# Patient Record
Sex: FEMALE | Race: WHITE | Employment: FULL TIME | URBAN - METROPOLITAN AREA
[De-identification: names, ages, dates, MRNs, and addresses within clinical notes are randomized per-mention and may not be internally consistent; named-entity substitution may affect disease eponyms.]

---

## 2024-02-17 ENCOUNTER — HOSPITAL ENCOUNTER (EMERGENCY)
Age: 34
Discharge: HOME | End: 2024-02-17
Attending: STUDENT IN AN ORGANIZED HEALTH CARE EDUCATION/TRAINING PROGRAM

## 2024-02-17 VITALS
DIASTOLIC BLOOD PRESSURE: 75 MMHG | HEART RATE: 90 BPM | HEIGHT: 67 IN | BODY MASS INDEX: 27.47 KG/M2 | RESPIRATION RATE: 18 BRPM | OXYGEN SATURATION: 99 % | WEIGHT: 175 LBS | SYSTOLIC BLOOD PRESSURE: 119 MMHG | TEMPERATURE: 99.1 F

## 2024-02-17 DIAGNOSIS — F43.0 ACUTE STRESS REACTION: ICD-10-CM

## 2024-02-17 DIAGNOSIS — F41.1 ANXIETY STATE: ICD-10-CM

## 2024-02-17 DIAGNOSIS — E86.0 DEHYDRATION: Primary | ICD-10-CM

## 2024-02-17 LAB
ALBUMIN SERPL-MCNC: 4.2 G/DL (ref 3.5–4.6)
ALP SERPL-CCNC: 79 U/L (ref 40–130)
ALT SERPL-CCNC: 8 U/L (ref 0–33)
ANION GAP SERPL CALCULATED.3IONS-SCNC: 9 MEQ/L (ref 9–15)
APAP SERPL-MCNC: <5 UG/ML (ref 10–30)
AST SERPL-CCNC: 15 U/L (ref 0–35)
BASOPHILS # BLD: 0.1 K/UL (ref 0–0.2)
BASOPHILS NFR BLD: 0.6 %
BILIRUB SERPL-MCNC: 0.3 MG/DL (ref 0.2–0.7)
BUN SERPL-MCNC: 13 MG/DL (ref 6–20)
CALCIUM SERPL-MCNC: 9.3 MG/DL (ref 8.5–9.9)
CHLORIDE SERPL-SCNC: 98 MEQ/L (ref 95–107)
CK SERPL-CCNC: 46 U/L (ref 0–170)
CO2 SERPL-SCNC: 28 MEQ/L (ref 20–31)
CREAT SERPL-MCNC: 0.9 MG/DL (ref 0.5–0.9)
EOSINOPHIL # BLD: 0 K/UL (ref 0–0.7)
EOSINOPHIL NFR BLD: 0.5 %
ERYTHROCYTE [DISTWIDTH] IN BLOOD BY AUTOMATED COUNT: 12 % (ref 11.5–14.5)
ETHANOL PERCENT: NORMAL G/DL
ETHANOLAMINE SERPL-MCNC: <10 MG/DL (ref 0–0.08)
GLOBULIN SER CALC-MCNC: 2.5 G/DL (ref 2.3–3.5)
GLUCOSE SERPL-MCNC: 93 MG/DL (ref 70–99)
HCT VFR BLD AUTO: 42.5 % (ref 37–47)
HGB BLD-MCNC: 14 G/DL (ref 12–16)
LYMPHOCYTES # BLD: 1.3 K/UL (ref 1–4.8)
LYMPHOCYTES NFR BLD: 15.1 %
MAGNESIUM SERPL-MCNC: 2.1 MG/DL (ref 1.7–2.4)
MCH RBC QN AUTO: 29.7 PG (ref 27–31.3)
MCHC RBC AUTO-ENTMCNC: 32.9 % (ref 33–37)
MCV RBC AUTO: 90 FL (ref 79.4–94.8)
MONOCYTES # BLD: 0.6 K/UL (ref 0.2–0.8)
MONOCYTES NFR BLD: 7 %
NEUTROPHILS # BLD: 6.4 K/UL (ref 1.4–6.5)
NEUTS SEG NFR BLD: 76.3 %
PLATELET # BLD AUTO: 309 K/UL (ref 130–400)
POTASSIUM SERPL-SCNC: 4.1 MEQ/L (ref 3.4–4.9)
PROT SERPL-MCNC: 6.7 G/DL (ref 6.3–8)
RBC # BLD AUTO: 4.72 M/UL (ref 4.2–5.4)
SALICYLATES SERPL-MCNC: <0.3 MG/DL (ref 15–30)
SODIUM SERPL-SCNC: 135 MEQ/L (ref 135–144)
TSH REFLEX: 1 UIU/ML (ref 0.44–3.86)
WBC # BLD AUTO: 8.4 K/UL (ref 4.8–10.8)

## 2024-02-17 PROCEDURE — 80179 DRUG ASSAY SALICYLATE: CPT

## 2024-02-17 PROCEDURE — 36415 COLL VENOUS BLD VENIPUNCTURE: CPT

## 2024-02-17 PROCEDURE — 96361 HYDRATE IV INFUSION ADD-ON: CPT

## 2024-02-17 PROCEDURE — 85025 COMPLETE CBC W/AUTO DIFF WBC: CPT

## 2024-02-17 PROCEDURE — 96360 HYDRATION IV INFUSION INIT: CPT

## 2024-02-17 PROCEDURE — 99284 EMERGENCY DEPT VISIT MOD MDM: CPT

## 2024-02-17 PROCEDURE — 82550 ASSAY OF CK (CPK): CPT

## 2024-02-17 PROCEDURE — 93005 ELECTROCARDIOGRAM TRACING: CPT | Performed by: STUDENT IN AN ORGANIZED HEALTH CARE EDUCATION/TRAINING PROGRAM

## 2024-02-17 PROCEDURE — 83735 ASSAY OF MAGNESIUM: CPT

## 2024-02-17 PROCEDURE — 2580000003 HC RX 258: Performed by: STUDENT IN AN ORGANIZED HEALTH CARE EDUCATION/TRAINING PROGRAM

## 2024-02-17 PROCEDURE — 82077 ASSAY SPEC XCP UR&BREATH IA: CPT

## 2024-02-17 PROCEDURE — 80053 COMPREHEN METABOLIC PANEL: CPT

## 2024-02-17 PROCEDURE — 84443 ASSAY THYROID STIM HORMONE: CPT

## 2024-02-17 PROCEDURE — 80143 DRUG ASSAY ACETAMINOPHEN: CPT

## 2024-02-17 PROCEDURE — 6370000000 HC RX 637 (ALT 250 FOR IP): Performed by: STUDENT IN AN ORGANIZED HEALTH CARE EDUCATION/TRAINING PROGRAM

## 2024-02-17 RX ORDER — HYDROXYZINE HYDROCHLORIDE 25 MG/1
25 TABLET, FILM COATED ORAL EVERY 8 HOURS PRN
Qty: 30 TABLET | Refills: 0 | Status: SHIPPED | OUTPATIENT
Start: 2024-02-17 | End: 2024-02-27

## 2024-02-17 RX ORDER — 0.9 % SODIUM CHLORIDE 0.9 %
1000 INTRAVENOUS SOLUTION INTRAVENOUS ONCE
Status: COMPLETED | OUTPATIENT
Start: 2024-02-17 | End: 2024-02-17

## 2024-02-17 RX ORDER — HYDROXYZINE HYDROCHLORIDE 25 MG/1
25 TABLET, FILM COATED ORAL ONCE
Status: COMPLETED | OUTPATIENT
Start: 2024-02-17 | End: 2024-02-17

## 2024-02-17 RX ADMIN — SODIUM CHLORIDE 1000 ML: 9 INJECTION, SOLUTION INTRAVENOUS at 13:41

## 2024-02-17 RX ADMIN — HYDROXYZINE HYDROCHLORIDE 25 MG: 25 TABLET, FILM COATED ORAL at 13:41

## 2024-02-17 ASSESSMENT — PAIN - FUNCTIONAL ASSESSMENT: PAIN_FUNCTIONAL_ASSESSMENT: NONE - DENIES PAIN

## 2024-02-17 NOTE — ED TRIAGE NOTES
Pt arrived to ED via EMS with c/o of nausea. Pt states she was driving she began to become dizzy and nauseous. Pt states she has long term covid. Pt denies dizziness currently and states she is just nauseous now. Pt states she has a hx of palpations. Pt is a&ox4

## 2024-02-17 NOTE — ED PROVIDER NOTES
[facility] ED  eMERGENCY dEPARTMENT eNCOUnter      Pt Name: Opal Garza  MRN: [id number]  Birthdate 1990  Date of evaluation: 2/17/2024  Provider: Ant Dinh MD  Note Started: 2/17/24 1:19 PM EST    HISTORY OF PRESENT ILLNESS      Chief Complaint   Patient presents with    Nausea       The history is provided by the Patient and EMS.  Opal Garza is a 33 y.o. female with a PMH clinically significant for Bipolar on Lamictal, Anxiety, Depression and Long-covid presenting to the ED via EMS c/o episode of lightheadedness, dizziness, nausea, palpitations, shortness of breath and feeling like she was going to pass out while she was driving her vehicle. Pulled to the side and got a  to help her. Patient states it started with shortness of breath and feeling like she was breathing fast.  Did have palpitations around that as well.  States that she has a history of long COVID and that she has had vasovagal episodes and palpitations due to this.  Also has neurological issues.  Also reports that her father recently passed away in December and that she has been depressed.  States she has not eaten for a month.  Says she is not drinking any fluids either.  Knows that she is depressed, but thinks that she is dealing with it okay. Normally lives in [place], but helping with the family right now who also stressed her out.  Thinks that the stress is making her long COVID worse.  Says she is sleeping about 6 to 8 hours per night, doing well there.  No suicidal ideations, homicidal ideations, auditory or visual hallucinations.  Denies any illicit substance abuse.  States she does regularly use THC for her long COVID.  Would like an appetite stimulant if she could.  Denies any alcohol.  Denies any associated: F/C/D, HA, Eye pain, Vision changes, CP, Abd pain, Vomiting, Dysuria, Hematuria, Difficulty urinating, or Concerns for Pregnancy.  Is feeling improved in the ED already.  States history  MD       33 y.o. female with a PMH clinically significant for Bipolar on Lamictal, Anxiety, Depression and Long-covid presenting to the ED via EMS c/o episode of lightheadedness, dizziness, nausea, palpitations, shortness of breath and feeling like she was going to pass out while she was driving her vehicle.  Upon initial evaluation, Pt anxious-appearing, but otherwise Afebrile, HDS and in NAD. PE as noted above.  Labs and EKG visualized and interpreted by myself and as noted above.  Given findings, clinical presentation most likely consistent w/ episode of lightheadedness and dizziness thought most likely consistent with panic attack versus exacerbation of anxiety in the setting of possible mild dehydration.  patient with recent loss in the family and increased stress at home.  Does not appear to be any danger to self or others at this time however.  Adamantly denying any suicidal ideations and yoni for safety in the ED.  Reportedly still taking her medications for bipolar as an outpatient.  No current psychiatric follow-up and state however.  Will therefore have  RN provide resources for outpatient follow-up on Monday.  No evidence of significant electrolyte abnormalities, anemia, arrhythmias or ischemic changes on EKG.  Low suspicion for cardiogenic syncope.  Patient feeling improved status post fluids in the ED.  Was observed for prolonged period time without recurrence of symptoms.  Also feeling improved status post Atarax.  Declined providing urine sample, stating she is not pregnant and does not have a urinary tract infection.  Will allow patient to be discharged without urine sample at this time.   Pt was administered   Medications   sodium chloride 0.9 % bolus 1,000 mL (0 mLs IntraVENous Stopped 2/17/24 1524)   hydrOXYzine HCl (ATARAX) tablet 25 mg (25 mg Oral Given 2/17/24 1341)       Plan: Discharge home in good condition with meds as noted below and instructions to follow up with PCPand

## 2024-02-17 NOTE — ED NOTES
Pt is awake, alert, and oriented, she was cooperative with talking with her.  Pt has no S/H/I, no A/V/T Hallucinations, and no other psychosis noted or expressed.  Pt came here in 2024 for her fathers  he  in 2023.  She has a place to stay here in [place], pt has no insurance, and little resources per the pt.  Dr Gonzales wanted pt to talk with  and give her information on places to F/U with in the community.  Written paper work on [facility] and [facility] in for assessments.  Pt accepted the information with an understanding.  Faxed Pt's medication from Dr Gonzales to [facility] advised she could pick the med up from [facility] on [address] at no cost to her.  Advised the med was Atarax and explained the medication to her

## 2024-02-19 LAB
EKG ATRIAL RATE: 85 BPM
EKG P AXIS: 54 DEGREES
EKG P-R INTERVAL: 124 MS
EKG Q-T INTERVAL: 364 MS
EKG QRS DURATION: 82 MS
EKG QTC CALCULATION (BAZETT): 433 MS
EKG R AXIS: 73 DEGREES
EKG T AXIS: 43 DEGREES
EKG VENTRICULAR RATE: 85 BPM

## 2024-02-19 PROCEDURE — 93010 ELECTROCARDIOGRAM REPORT: CPT | Performed by: INTERNAL MEDICINE

## 2024-03-11 ENCOUNTER — HOSPITAL ENCOUNTER (EMERGENCY)
Facility: HOSPITAL | Age: 34
Discharge: HOME | End: 2024-03-11
Attending: EMERGENCY MEDICINE

## 2024-03-11 ENCOUNTER — APPOINTMENT (OUTPATIENT)
Dept: CARDIOLOGY | Facility: HOSPITAL | Age: 34
End: 2024-03-11

## 2024-03-11 ENCOUNTER — APPOINTMENT (OUTPATIENT)
Dept: RADIOLOGY | Facility: HOSPITAL | Age: 34
End: 2024-03-11

## 2024-03-11 VITALS
TEMPERATURE: 98.8 F | DIASTOLIC BLOOD PRESSURE: 98 MMHG | HEART RATE: 78 BPM | WEIGHT: 169 LBS | OXYGEN SATURATION: 99 % | BODY MASS INDEX: 26.53 KG/M2 | HEIGHT: 67 IN | SYSTOLIC BLOOD PRESSURE: 134 MMHG | RESPIRATION RATE: 17 BRPM

## 2024-03-11 DIAGNOSIS — F41.9 ANXIETY: ICD-10-CM

## 2024-03-11 DIAGNOSIS — R42 DIZZINESS: Primary | ICD-10-CM

## 2024-03-11 DIAGNOSIS — R42 LIGHTHEADEDNESS: ICD-10-CM

## 2024-03-11 LAB
ALBUMIN SERPL BCP-MCNC: 4.1 G/DL (ref 3.4–5)
ALP SERPL-CCNC: 67 U/L (ref 33–110)
ALT SERPL W P-5'-P-CCNC: 10 U/L (ref 7–45)
AMPHETAMINES UR QL SCN: ABNORMAL
ANION GAP SERPL CALC-SCNC: 12 MMOL/L (ref 10–20)
APAP SERPL-MCNC: <10 UG/ML
APPEARANCE UR: ABNORMAL
AST SERPL W P-5'-P-CCNC: 15 U/L (ref 9–39)
BARBITURATES UR QL SCN: ABNORMAL
BASOPHILS # BLD AUTO: 0.06 X10*3/UL (ref 0–0.1)
BASOPHILS NFR BLD AUTO: 0.7 %
BENZODIAZ UR QL SCN: ABNORMAL
BILIRUB SERPL-MCNC: 0.7 MG/DL (ref 0–1.2)
BILIRUB UR STRIP.AUTO-MCNC: NEGATIVE MG/DL
BNP SERPL-MCNC: 12 PG/ML (ref 0–99)
BUN SERPL-MCNC: 10 MG/DL (ref 6–23)
BZE UR QL SCN: ABNORMAL
CALCIUM SERPL-MCNC: 8.9 MG/DL (ref 8.6–10.3)
CANNABINOIDS UR QL SCN: ABNORMAL
CARDIAC TROPONIN I PNL SERPL HS: <3 NG/L (ref 0–13)
CHLORIDE SERPL-SCNC: 105 MMOL/L (ref 98–107)
CO2 SERPL-SCNC: 25 MMOL/L (ref 21–32)
COLOR UR: YELLOW
CREAT SERPL-MCNC: 0.88 MG/DL (ref 0.5–1.05)
D DIMER PPP FEU-MCNC: <215 NG/ML FEU
EGFRCR SERPLBLD CKD-EPI 2021: 88 ML/MIN/1.73M*2
EOSINOPHIL # BLD AUTO: 0.03 X10*3/UL (ref 0–0.7)
EOSINOPHIL NFR BLD AUTO: 0.4 %
ERYTHROCYTE [DISTWIDTH] IN BLOOD BY AUTOMATED COUNT: 12.6 % (ref 11.5–14.5)
ETHANOL SERPL-MCNC: <10 MG/DL
FENTANYL+NORFENTANYL UR QL SCN: ABNORMAL
FLUAV RNA RESP QL NAA+PROBE: NOT DETECTED
FLUBV RNA RESP QL NAA+PROBE: NOT DETECTED
GLUCOSE SERPL-MCNC: 100 MG/DL (ref 74–99)
GLUCOSE UR STRIP.AUTO-MCNC: NEGATIVE MG/DL
HCG UR QL IA.RAPID: NEGATIVE
HCT VFR BLD AUTO: 42 % (ref 36–46)
HGB BLD-MCNC: 14.1 G/DL (ref 12–16)
HOLD SPECIMEN: 293
IMM GRANULOCYTES # BLD AUTO: 0.02 X10*3/UL (ref 0–0.7)
IMM GRANULOCYTES NFR BLD AUTO: 0.2 % (ref 0–0.9)
KETONES UR STRIP.AUTO-MCNC: ABNORMAL MG/DL
LACTATE SERPL-SCNC: 0.7 MMOL/L (ref 0.4–2)
LEUKOCYTE ESTERASE UR QL STRIP.AUTO: NEGATIVE
LIPASE SERPL-CCNC: 19 U/L (ref 9–82)
LYMPHOCYTES # BLD AUTO: 1.12 X10*3/UL (ref 1.2–4.8)
LYMPHOCYTES NFR BLD AUTO: 13.6 %
MAGNESIUM SERPL-MCNC: 1.94 MG/DL (ref 1.6–2.4)
MCH RBC QN AUTO: 29.3 PG (ref 26–34)
MCHC RBC AUTO-ENTMCNC: 33.6 G/DL (ref 32–36)
MCV RBC AUTO: 87 FL (ref 80–100)
METHADONE UR QL SCN: ABNORMAL
MONOCYTES # BLD AUTO: 0.61 X10*3/UL (ref 0.1–1)
MONOCYTES NFR BLD AUTO: 7.4 %
NEUTROPHILS # BLD AUTO: 6.42 X10*3/UL (ref 1.2–7.7)
NEUTROPHILS NFR BLD AUTO: 77.7 %
NITRITE UR QL STRIP.AUTO: NEGATIVE
NRBC BLD-RTO: 0 /100 WBCS (ref 0–0)
OPIATES UR QL SCN: ABNORMAL
OXYCODONE+OXYMORPHONE UR QL SCN: ABNORMAL
PCP UR QL SCN: ABNORMAL
PH UR STRIP.AUTO: 6 [PH]
PLATELET # BLD AUTO: 352 X10*3/UL (ref 150–450)
POTASSIUM SERPL-SCNC: 3.7 MMOL/L (ref 3.5–5.3)
PROT SERPL-MCNC: 6.6 G/DL (ref 6.4–8.2)
PROT UR STRIP.AUTO-MCNC: NEGATIVE MG/DL
RBC # BLD AUTO: 4.82 X10*6/UL (ref 4–5.2)
RBC # UR STRIP.AUTO: NEGATIVE /UL
RSV RNA RESP QL NAA+PROBE: NOT DETECTED
SALICYLATES SERPL-MCNC: <3 MG/DL
SARS-COV-2 RNA RESP QL NAA+PROBE: NOT DETECTED
SODIUM SERPL-SCNC: 138 MMOL/L (ref 136–145)
SP GR UR STRIP.AUTO: 1.01
TSH SERPL-ACNC: 1.51 MIU/L (ref 0.44–3.98)
UROBILINOGEN UR STRIP.AUTO-MCNC: <2 MG/DL
WBC # BLD AUTO: 8.3 X10*3/UL (ref 4.4–11.3)

## 2024-03-11 PROCEDURE — 2500000004 HC RX 250 GENERAL PHARMACY W/ HCPCS (ALT 636 FOR OP/ED): Performed by: NURSE PRACTITIONER

## 2024-03-11 PROCEDURE — 70450 CT HEAD/BRAIN W/O DYE: CPT | Performed by: STUDENT IN AN ORGANIZED HEALTH CARE EDUCATION/TRAINING PROGRAM

## 2024-03-11 PROCEDURE — 70450 CT HEAD/BRAIN W/O DYE: CPT

## 2024-03-11 PROCEDURE — 87637 SARSCOV2&INF A&B&RSV AMP PRB: CPT | Performed by: NURSE PRACTITIONER

## 2024-03-11 PROCEDURE — 84484 ASSAY OF TROPONIN QUANT: CPT | Performed by: NURSE PRACTITIONER

## 2024-03-11 PROCEDURE — 71046 X-RAY EXAM CHEST 2 VIEWS: CPT

## 2024-03-11 PROCEDURE — 81003 URINALYSIS AUTO W/O SCOPE: CPT | Performed by: NURSE PRACTITIONER

## 2024-03-11 PROCEDURE — 99285 EMERGENCY DEPT VISIT HI MDM: CPT | Mod: 25

## 2024-03-11 PROCEDURE — 80307 DRUG TEST PRSMV CHEM ANLYZR: CPT | Performed by: NURSE PRACTITIONER

## 2024-03-11 PROCEDURE — 80179 DRUG ASSAY SALICYLATE: CPT | Performed by: NURSE PRACTITIONER

## 2024-03-11 PROCEDURE — 85379 FIBRIN DEGRADATION QUANT: CPT | Performed by: NURSE PRACTITIONER

## 2024-03-11 PROCEDURE — 71046 X-RAY EXAM CHEST 2 VIEWS: CPT | Mod: FOREIGN READ | Performed by: RADIOLOGY

## 2024-03-11 PROCEDURE — 80143 DRUG ASSAY ACETAMINOPHEN: CPT | Performed by: NURSE PRACTITIONER

## 2024-03-11 PROCEDURE — 84443 ASSAY THYROID STIM HORMONE: CPT | Performed by: NURSE PRACTITIONER

## 2024-03-11 PROCEDURE — 2500000001 HC RX 250 WO HCPCS SELF ADMINISTERED DRUGS (ALT 637 FOR MEDICARE OP): Performed by: NURSE PRACTITIONER

## 2024-03-11 PROCEDURE — 84460 ALANINE AMINO (ALT) (SGPT): CPT | Performed by: NURSE PRACTITIONER

## 2024-03-11 PROCEDURE — 83880 ASSAY OF NATRIURETIC PEPTIDE: CPT | Performed by: NURSE PRACTITIONER

## 2024-03-11 PROCEDURE — 82077 ASSAY SPEC XCP UR&BREATH IA: CPT | Performed by: NURSE PRACTITIONER

## 2024-03-11 PROCEDURE — 36415 COLL VENOUS BLD VENIPUNCTURE: CPT | Performed by: NURSE PRACTITIONER

## 2024-03-11 PROCEDURE — 83690 ASSAY OF LIPASE: CPT | Performed by: NURSE PRACTITIONER

## 2024-03-11 PROCEDURE — 93005 ELECTROCARDIOGRAM TRACING: CPT

## 2024-03-11 PROCEDURE — 83605 ASSAY OF LACTIC ACID: CPT | Performed by: NURSE PRACTITIONER

## 2024-03-11 PROCEDURE — 81025 URINE PREGNANCY TEST: CPT | Performed by: NURSE PRACTITIONER

## 2024-03-11 PROCEDURE — 83735 ASSAY OF MAGNESIUM: CPT | Performed by: NURSE PRACTITIONER

## 2024-03-11 PROCEDURE — 85025 COMPLETE CBC W/AUTO DIFF WBC: CPT | Performed by: NURSE PRACTITIONER

## 2024-03-11 RX ORDER — LORAZEPAM 1 MG/1
1 TABLET ORAL ONCE
Status: COMPLETED | OUTPATIENT
Start: 2024-03-11 | End: 2024-03-11

## 2024-03-11 RX ADMIN — SODIUM CHLORIDE 1000 ML: 9 INJECTION, SOLUTION INTRAVENOUS at 11:41

## 2024-03-11 RX ADMIN — LORAZEPAM 1 MG: 1 TABLET ORAL at 11:36

## 2024-03-11 ASSESSMENT — LIFESTYLE VARIABLES
HAVE PEOPLE ANNOYED YOU BY CRITICIZING YOUR DRINKING: NO
EVER FELT BAD OR GUILTY ABOUT YOUR DRINKING: NO
HAVE YOU EVER FELT YOU SHOULD CUT DOWN ON YOUR DRINKING: NO
EVER HAD A DRINK FIRST THING IN THE MORNING TO STEADY YOUR NERVES TO GET RID OF A HANGOVER: NO

## 2024-03-11 ASSESSMENT — PAIN - FUNCTIONAL ASSESSMENT: PAIN_FUNCTIONAL_ASSESSMENT: 0-10

## 2024-03-11 ASSESSMENT — PAIN SCALES - GENERAL
PAINLEVEL_OUTOF10: 0 - NO PAIN
PAINLEVEL_OUTOF10: 0 - NO PAIN

## 2024-03-11 NOTE — ED PROVIDER NOTES
[facility]  ED  Encounter Note  Admit Date/RoomTime: 3/11/2024 11:06 AM  ED Room: [facility]  NAME: Anne Parker  : 1990  MRN: [id number]     Chief Complaint:  Dizziness (Just started new dose of Wellbutrin today (went from 75-->150mg) and also smoke a bowl of weed this morning (states this is normal for her). Some slight intermittent chest pressure but mostly dizziness. )    HISTORY OF PRESENT ILLNESS        Anne Parker is a 33 y.o. female who presents to the ED for evaluation of acute dizziness. She stated that she acutely felt unwell. She stated that she does not have a headache or hx of migraine but feels unwell. She stated that she is a little dizzy. She stated that a few things have been going on = her depression meds recently increased and she does smoke MJ daily which is not unusual. She denied any numbness weakness vision issues. No birth control, blood thinners.    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 No stents from same and did not need a pacemaker/defib. She stated that she now lives in [place]. Dad just  and is going thru a lot. No fever or chills,coughing, urinary issues.     ROS   Pertinent positives and negatives are stated within HPI, all other systems reviewed and are negative.    Past Medical History:  has a past medical history of Bipolar 1 disorder (CMS/[facility]). Anxiety depression MI cardiac arrest  long covid    Surgical History:  has no past surgical history on file.    Social History:   denied nicotine alcohol + MJ use    Family History: family history is not on file.     Allergies: Patient has no known allergies.    PHYSICAL EXAM   Oxygen Saturation Interpretation: Normal.         Physical Exam  Constitutional/General: Alert and oriented x3, well appearing, non toxic  HEENT:  NC/NT. PERRLA.  Airway patent.  Neck: Supple, full ROM. No midline vertebral  tenderness or crepitus.   Respiratory: Lung sounds clear to auscultation bilaterally. No wheezes, rhonchi or stridor. Not in respiratory distress.  CV:  Regular rate. Regular rhythm. No murmurs or rubs. 2+ distal pulses.  GI:  Abdomen soft, non-tender, non-distended. +BS. No rebound, guarding, or rigidity. No pulsatile masses.  Musculoskeletal: Moves all extremities x 4. Warm and well perfused. Capillary refill <3 seconds  Integument: Skin warm and dry. No rashes.   Neurologic: Alert and oriented with no focal deficits, symmetric strength 5/5 in the upper and lower extremities bilaterally.  Psychiatric: Normal affect.    Lab / Imaging Results   (All laboratory and radiology results have been personally reviewed by myself)  Labs:  Results for orders placed or performed during the hospital encounter of 03/11/24   hCG, Urine, Qualitative   Result Value Ref Range    HCG, Urine NEGATIVE NEGATIVE   CBC and Auto Differential   Result Value Ref Range    WBC 8.3 4.4 - 11.3 x10*3/uL    nRBC 0.0 0.0 - 0.0 /100 WBCs    RBC 4.82 4.00 - 5.20 x10*6/uL    Hemoglobin 14.1 12.0 - 16.0 g/dL    Hematocrit 42.0 36.0 - 46.0 %    MCV 87 80 - 100 fL    MCH 29.3 26.0 - 34.0 pg    MCHC 33.6 32.0 - 36.0 g/dL    RDW 12.6 11.5 - 14.5 %    Platelets 352 150 - 450 x10*3/uL    Neutrophils % 77.7 40.0 - 80.0 %    Immature Granulocytes %, Automated 0.2 0.0 - 0.9 %    Lymphocytes % 13.6 13.0 - 44.0 %    Monocytes % 7.4 2.0 - 10.0 %    Eosinophils % 0.4 0.0 - 6.0 %    Basophils % 0.7 0.0 - 2.0 %    Neutrophils Absolute 6.42 1.20 - 7.70 x10*3/uL    Immature Granulocytes Absolute, Automated 0.02 0.00 - 0.70 x10*3/uL    Lymphocytes Absolute 1.12 (L) 1.20 - 4.80 x10*3/uL    Monocytes Absolute 0.61 0.10 - 1.00 x10*3/uL    Eosinophils Absolute 0.03 0.00 - 0.70 x10*3/uL    Basophils Absolute 0.06 0.00 - 0.10 x10*3/uL   Magnesium   Result Value Ref Range    Magnesium 1.94 1.60 - 2.40 mg/dL   Comprehensive metabolic panel   Result Value Ref Range    Glucose  100 (H) 74 - 99 mg/dL    Sodium 138 136 - 145 mmol/L    Potassium 3.7 3.5 - 5.3 mmol/L    Chloride 105 98 - 107 mmol/L    Bicarbonate 25 21 - 32 mmol/L    Anion Gap 12 10 - 20 mmol/L    Urea Nitrogen 10 6 - 23 mg/dL    Creatinine 0.88 0.50 - 1.05 mg/dL    eGFR 88 >60 mL/min/1.73m*2    Calcium 8.9 8.6 - 10.3 mg/dL    Albumin 4.1 3.4 - 5.0 g/dL    Alkaline Phosphatase 67 33 - 110 U/L    Total Protein 6.6 6.4 - 8.2 g/dL    AST 15 9 - 39 U/L    Bilirubin, Total 0.7 0.0 - 1.2 mg/dL    ALT 10 7 - 45 U/L   Lipase   Result Value Ref Range    Lipase 19 9 - 82 U/L   Lactate   Result Value Ref Range    Lactate 0.7 0.4 - 2.0 mmol/L   Troponin I, High Sensitivity   Result Value Ref Range    Troponin I, High Sensitivity <3 0 - 13 ng/L   B-Type Natriuretic Peptide   Result Value Ref Range    BNP 12 0 - 99 pg/mL   TSH with reflex to Free T4 if abnormal   Result Value Ref Range    Thyroid Stimulating Hormone 1.51 0.44 - 3.98 mIU/L   D-dimer, VTE Exclusion   Result Value Ref Range    D-Dimer, Quantitative VTE Exclusion <215 <=500 ng/mL FEU   Sars-CoV-2 and Influenza A/B PCR   Result Value Ref Range    Flu A Result Not Detected Not Detected    Flu B Result Not Detected Not Detected    Coronavirus 2019, PCR Not Detected Not Detected   RSV PCR   Result Value Ref Range    RSV PCR Not Detected Not Detected   Urinalysis with Reflex Culture and Microscopic   Result Value Ref Range    Color, Urine Yellow Straw, Yellow    Appearance, Urine Hazy (N) Clear    Specific Gravity, Urine 1.009 1.005 - 1.035    pH, Urine 6.0 5.0, 5.5, 6.0, 6.5, 7.0, 7.5, 8.0    Protein, Urine NEGATIVE NEGATIVE mg/dL    Glucose, Urine NEGATIVE NEGATIVE mg/dL    Blood, Urine NEGATIVE NEGATIVE    Ketones, Urine 5 (TRACE) (A) NEGATIVE mg/dL    Bilirubin, Urine NEGATIVE NEGATIVE    Urobilinogen, Urine <2.0 <2.0 mg/dL    Nitrite, Urine NEGATIVE NEGATIVE    Leukocyte Esterase, Urine NEGATIVE NEGATIVE   Extra Urine Gray Tube   Result Value Ref Range    Extra Tube 293    Drug  Screen, Urine   Result Value Ref Range    Amphetamine Screen, Urine Presumptive Negative Presumptive Negative    Barbiturate Screen, Urine Presumptive Negative Presumptive Negative    Benzodiazepines Screen, Urine Presumptive Negative Presumptive Negative    Cannabinoid Screen, Urine Presumptive Positive (A) Presumptive Negative    Cocaine Metabolite Screen, Urine Presumptive Negative Presumptive Negative    Fentanyl Screen, Urine Presumptive Negative Presumptive Negative    Opiate Screen, Urine Presumptive Negative Presumptive Negative    Oxycodone Screen, Urine Presumptive Negative Presumptive Negative    PCP Screen, Urine Presumptive Negative Presumptive Negative    Methadone Screen, Urine Presumptive Negative Presumptive Negative   Ethanol   Result Value Ref Range    Alcohol <10 <=10 mg/dL   Acetaminophen level   Result Value Ref Range    Acetaminophen <10.0 10.0 - 30.0 ug/mL   Salicylate level   Result Value Ref Range    Salicylate  <3 4 - 20 mg/dL   ECG 12 lead   Result Value Ref Range    Ventricular Rate 97 BPM    Atrial Rate 97 BPM    KS Interval 127 ms    QRS Duration 84 ms    QT Interval 298 ms    QTC Calculation(Bazett) 379 ms    P Axis 67 degrees    R Axis 68 degrees    T Axis -65 degrees    QRS Count 16 beats    Q Onset 252 ms    T Offset 401 ms    QTC Fredericia 349 ms     Imaging:  All Radiology results interpreted by Radiologist unless otherwise noted.  CT head wo IV contrast   Final Result   No acute intracranial hemorrhage, mass effect, or CT apparent acute   infarct.        Signed by: Ruperto Darnell 3/11/2024 1:53 PM   Dictation workstation:   [id number]      XR chest 2 views   Final Result   No acute process.   Signed by Devin Taylor MD          ED Course / Medical Decision Making     Medications   sodium chloride 0.9 % bolus 1,000 mL (0 mL intravenous Stopped 3/11/24 1211)   LORazepam (Ativan) tablet 1 mg (1 mg oral Given 3/11/24 1136)     ED Course as of 03/12/24 1912   Mon Mar 11, 2024    1115 Rate 97 sinus rhythm, pr 127, qt qtc 298/379  Normal rate axis [PK]      ED Course User Index  [PK] Yenni Young, APRN-CNP         Diagnoses as of 24   Dizziness   Lightheadedness   Anxiety     Re-examination:    Patient’s condition stable.    Consult(s):   Offered epa           MDM:       Anne Parker is a 33 y.o. female who presents to the ED for evaluation of acute dizziness. She stated that she acutely felt unwell. She stated that she does not have a headache or hx of migraine but feels unwell. She stated that she is a little dizzy. She stated that a few things have been going on = her depression meds recently increased and she does smoke MJ daily which is not unusual. She denied any numbness weakness vision issues. No birth control, blood thinners.    She stated that post covid vaccination she had myocarditis and suffered a post cardiac arrest in [place], [place] . No stents from same and did not need a pacemaker/defib. She stated that she now lives in [place]. Dad just  and is going thru a lot. No fever or chills,coughing, urinary issues.     ED course stable  Imaging ct scan head wnl.   Cxr wnl  Lab work wnl.   Urine drug showed + THC  pt admits to daily use.  Glucose was low 70. She has not been able to eat well since dad . She was given ginger ale, crackers and fruit cup. She felt better. She was tearful during ED course. She stated that she wants to feel better. She sees doctor in [place] that prescribes her Wellbutrin which was doubled today. She is trying to establish local doctor in [facility]. She contacted [place] for counseling and psychiatrist. She cannot get in for a few weeks. She stated originally she wanted to see epat or talk with someone in ED. She feels little support. She then changed her mind as she did not want to wait any longer. She denied HI SI or av or vh or feeling paranoid. She is sleeping fair - around 5 hours. Appetite is  primarily an issue but not new.  VSS stable.  She can reach out to [facility] tomorrow for earlier appt if able.  Return back to the ED if needed. Try to eat small meals. Having lower glucoses since not eating well. She will try to eat.   Consider going back to daily Wellbutrin dose and reach out to provider today or tomorrow.  Ddx: dehydration dizziness anxiety     Plan of Care/Counseling:  I reviewed today's visit with the patient  in addition to providing specific details for the plan of care and counseling regarding the diagnosis and prognosis.  Questions are answered at this time and are agreeable with the plan.    ASSESSMENT     1. Dizziness    2. Lightheadedness    3. Anxiety      PLAN   Discharge home     New Medications     New Medications Ordered This Visit   Medications    sodium chloride 0.9 % bolus 1,000 mL    LORazepam (Ativan) tablet 1 mg     Electronically signed by FREDO Gramajo     **This report was transcribed using voice recognition software. Every effort was made to ensure accuracy; however, inadvertent computerized transcription errors may be present.  END OF ED PROVIDER NOTE     FREDO Gramajo  03/12/24 8792

## 2024-03-12 ENCOUNTER — HOSPITAL ENCOUNTER (EMERGENCY)
Facility: HOSPITAL | Age: 34
Discharge: LEFT BEFORE DISCHARGE | End: 2024-03-12
Attending: EMERGENCY MEDICINE

## 2024-03-12 ENCOUNTER — HOSPITAL ENCOUNTER (EMERGENCY)
Facility: HOSPITAL | Age: 34
Discharge: OTHER INSTITUTION | End: 2024-03-14
Attending: EMERGENCY MEDICINE

## 2024-03-12 VITALS
HEART RATE: 115 BPM | WEIGHT: 169 LBS | OXYGEN SATURATION: 96 % | BODY MASS INDEX: 26.53 KG/M2 | HEIGHT: 67 IN | TEMPERATURE: 99.1 F | RESPIRATION RATE: 16 BRPM | SYSTOLIC BLOOD PRESSURE: 131 MMHG | DIASTOLIC BLOOD PRESSURE: 90 MMHG

## 2024-03-12 DIAGNOSIS — F29 PSYCHOSIS, UNSPECIFIED PSYCHOSIS TYPE (MULTI): Primary | ICD-10-CM

## 2024-03-12 LAB
AMPHETAMINES UR QL SCN: ABNORMAL
BARBITURATES UR QL SCN: ABNORMAL
BENZODIAZ UR QL SCN: ABNORMAL
BZE UR QL SCN: ABNORMAL
CANNABINOIDS UR QL SCN: ABNORMAL
ETHANOL SERPL-MCNC: <10 MG/DL
FENTANYL+NORFENTANYL UR QL SCN: ABNORMAL
METHADONE UR QL SCN: ABNORMAL
OPIATES UR QL SCN: ABNORMAL
OXYCODONE+OXYMORPHONE UR QL SCN: ABNORMAL
PCP UR QL SCN: ABNORMAL

## 2024-03-12 PROCEDURE — 82077 ASSAY SPEC XCP UR&BREATH IA: CPT | Performed by: EMERGENCY MEDICINE

## 2024-03-12 PROCEDURE — 80307 DRUG TEST PRSMV CHEM ANLYZR: CPT | Performed by: EMERGENCY MEDICINE

## 2024-03-12 PROCEDURE — 4500999001 HC ED NO CHARGE

## 2024-03-12 PROCEDURE — 36415 COLL VENOUS BLD VENIPUNCTURE: CPT | Performed by: EMERGENCY MEDICINE

## 2024-03-12 PROCEDURE — 2500000001 HC RX 250 WO HCPCS SELF ADMINISTERED DRUGS (ALT 637 FOR MEDICARE OP): Performed by: EMERGENCY MEDICINE

## 2024-03-12 PROCEDURE — 99285 EMERGENCY DEPT VISIT HI MDM: CPT

## 2024-03-12 RX ORDER — LAMOTRIGINE 100 MG/1
100 TABLET ORAL 2 TIMES DAILY
Status: DISCONTINUED | OUTPATIENT
Start: 2024-03-12 | End: 2024-03-14 | Stop reason: HOSPADM

## 2024-03-12 RX ORDER — FLUOXETINE HYDROCHLORIDE 20 MG/1
20 CAPSULE ORAL DAILY
Status: DISCONTINUED | OUTPATIENT
Start: 2024-03-12 | End: 2024-03-14 | Stop reason: HOSPADM

## 2024-03-12 RX ORDER — BUPROPION HYDROCHLORIDE 150 MG/1
150 TABLET ORAL DAILY
Status: DISCONTINUED | OUTPATIENT
Start: 2024-03-12 | End: 2024-03-14 | Stop reason: HOSPADM

## 2024-03-12 RX ADMIN — FLUOXETINE 20 MG: 20 CAPSULE ORAL at 10:00

## 2024-03-12 RX ADMIN — LAMOTRIGINE 100 MG: 100 TABLET ORAL at 20:40

## 2024-03-12 RX ADMIN — LAMOTRIGINE 100 MG: 100 TABLET ORAL at 10:00

## 2024-03-12 RX ADMIN — BUPROPION HYDROCHLORIDE 150 MG: 150 TABLET, EXTENDED RELEASE ORAL at 10:00

## 2024-03-12 SDOH — HEALTH STABILITY: MENTAL HEALTH: ARE YOU HERE BECAUSE YOU TRIED TO HURT YOURSELF?: NO

## 2024-03-12 SDOH — HEALTH STABILITY: MENTAL HEALTH: DELUSIONS: CONTROLLED

## 2024-03-12 SDOH — SOCIAL STABILITY: SOCIAL NETWORK: PARENT/GUARDIAN/SIGNIFICANT OTHER INVOLVEMENT: NO INVOLVEMENT

## 2024-03-12 SDOH — HEALTH STABILITY: MENTAL HEALTH: SLEEP PATTERN: DIFFICULTY FALLING ASLEEP;RESTLESSNESS

## 2024-03-12 SDOH — HEALTH STABILITY: MENTAL HEALTH: BEHAVIORS/MOOD: CALM;PLEASANT

## 2024-03-12 SDOH — HEALTH STABILITY: MENTAL HEALTH: SLEEP PATTERN: DIFFICULTY FALLING ASLEEP

## 2024-03-12 SDOH — SOCIAL STABILITY: SOCIAL INSECURITY: FAMILY BEHAVIORS: UNABLE TO ASSESS

## 2024-03-12 SDOH — HEALTH STABILITY: MENTAL HEALTH: BEHAVIORS/MOOD: ANXIOUS;PACING;RESTLESS;PARANOID

## 2024-03-12 SDOH — HEALTH STABILITY: MENTAL HEALTH: IN THE PAST WEEK, HAVE YOU BEEN HAVING THOUGHTS ABOUT KILLING YOURSELF?: NO

## 2024-03-12 SDOH — HEALTH STABILITY: MENTAL HEALTH: SLEEP PATTERN: RESTLESSNESS

## 2024-03-12 SDOH — HEALTH STABILITY: MENTAL HEALTH: HAVE YOU WISHED YOU WERE DEAD OR WISHED YOU COULD GO TO SLEEP AND NOT WAKE UP?: NO

## 2024-03-12 SDOH — HEALTH STABILITY: MENTAL HEALTH: HALLUCINATION: AUDITORY;VISUAL

## 2024-03-12 SDOH — HEALTH STABILITY: MENTAL HEALTH: HAVE YOU ACTUALLY HAD ANY THOUGHTS OF KILLING YOURSELF?: NO

## 2024-03-12 SDOH — HEALTH STABILITY: MENTAL HEALTH: SUICIDE ASSESSMENT: ADULT (C-SSRS)

## 2024-03-12 SDOH — HEALTH STABILITY: MENTAL HEALTH: HAS SOMETHING VERY STRESSFUL HAPPENED TO YOU IN THE PAST FEW WEEKS (A SITUATION VERY HARD TO HANDLE)?: NO

## 2024-03-12 SDOH — HEALTH STABILITY: MENTAL HEALTH: NEEDS EXPRESSED: DENIES

## 2024-03-12 SDOH — SOCIAL STABILITY: SOCIAL NETWORK: VISITOR BEHAVIORS: UNABLE TO ASSESS

## 2024-03-12 SDOH — HEALTH STABILITY: MENTAL HEALTH: BEHAVIORS/MOOD: ANXIOUS;PARANOID;PACING;RESTLESS

## 2024-03-12 SDOH — HEALTH STABILITY: MENTAL HEALTH: DELUSIONS: PARANOID

## 2024-03-12 SDOH — HEALTH STABILITY: MENTAL HEALTH: BEHAVIORS/MOOD: RESTLESS

## 2024-03-12 SDOH — HEALTH STABILITY: MENTAL HEALTH: HALLUCINATION: AUDITORY

## 2024-03-12 SDOH — HEALTH STABILITY: MENTAL HEALTH: HAVE YOU EVER TRIED TO HURT YOURSELF IN THE PAST (OTHER THAN THIS TIME)?: NO

## 2024-03-12 SDOH — SOCIAL STABILITY: SOCIAL NETWORK: EMOTIONAL SUPPORT GIVEN: REASSURE

## 2024-03-12 SDOH — HEALTH STABILITY: MENTAL HEALTH: CONTENT: UNABLE TO ASSESS

## 2024-03-12 SDOH — HEALTH STABILITY: MENTAL HEALTH: CONTENT: PHOBIAS;PREOCCUPATION

## 2024-03-12 SDOH — HEALTH STABILITY: MENTAL HEALTH: BEHAVIORS/MOOD: PARANOID;RESTLESS;PACING;ANXIOUS

## 2024-03-12 SDOH — HEALTH STABILITY: MENTAL HEALTH: HAVE YOU EVER DONE ANYTHING, STARTED TO DO ANYTHING, OR PREPARED TO DO ANYTHING TO END YOUR LIFE?: NO

## 2024-03-12 SDOH — HEALTH STABILITY: MENTAL HEALTH: BEHAVIORS/MOOD: CALM

## 2024-03-12 SDOH — HEALTH STABILITY: MENTAL HEALTH: CONTENT: DELUSIONS

## 2024-03-12 SDOH — SOCIAL STABILITY: SOCIAL NETWORK: PARENT/GUARDIAN/SIGNIFICANT OTHER INVOLVEMENT: ATTENTIVE TO PATIENT NEEDS

## 2024-03-12 SDOH — HEALTH STABILITY: MENTAL HEALTH: BEHAVIORS/MOOD: IMPULSIVE;COOPERATIVE;WANDERING

## 2024-03-12 SDOH — SOCIAL STABILITY: SOCIAL NETWORK: VISITOR BEHAVIORS: CALM

## 2024-03-12 SDOH — SOCIAL STABILITY: SOCIAL NETWORK: VISITOR BEHAVIORS: SUPPORTIVE;CALM;COOPERATIVE

## 2024-03-12 SDOH — HEALTH STABILITY: MENTAL HEALTH: BEHAVIORS/MOOD: COOPERATIVE;PLEASANT

## 2024-03-12 SDOH — SOCIAL STABILITY: SOCIAL NETWORK: PARENT/GUARDIAN/SIGNIFICANT OTHER INVOLVEMENT: OTHER (COMMENT)

## 2024-03-12 SDOH — SOCIAL STABILITY: SOCIAL INSECURITY: FAMILY BEHAVIORS: SUPPORTIVE;CALM;COOPERATIVE

## 2024-03-12 SDOH — HEALTH STABILITY: MENTAL HEALTH: BEHAVIORS/MOOD: RESTLESS;PARANOID

## 2024-03-12 SDOH — HEALTH STABILITY: MENTAL HEALTH: BEHAVIORS/MOOD: ANXIOUS;RESTLESS;PARANOID;IRRITABLE;IMPULSIVE;PACING

## 2024-03-12 SDOH — HEALTH STABILITY: MENTAL HEALTH: SLEEP PATTERN: INSOMNIA

## 2024-03-12 SDOH — HEALTH STABILITY: MENTAL HEALTH: SLEEP PATTERN: RESTLESSNESS;DIFFICULTY FALLING ASLEEP

## 2024-03-12 ASSESSMENT — PAIN SCALES - GENERAL
PAINLEVEL_OUTOF10: 0 - NO PAIN
PAINLEVEL_OUTOF10: 0 - NO PAIN

## 2024-03-12 ASSESSMENT — PAIN - FUNCTIONAL ASSESSMENT
PAIN_FUNCTIONAL_ASSESSMENT: 0-10
PAIN_FUNCTIONAL_ASSESSMENT: 0-10

## 2024-03-12 ASSESSMENT — COLUMBIA-SUICIDE SEVERITY RATING SCALE - C-SSRS
6. HAVE YOU EVER DONE ANYTHING, STARTED TO DO ANYTHING, OR PREPARED TO DO ANYTHING TO END YOUR LIFE?: NO
2. HAVE YOU ACTUALLY HAD ANY THOUGHTS OF KILLING YOURSELF?: NO
2. HAVE YOU ACTUALLY HAD ANY THOUGHTS OF KILLING YOURSELF?: NO
1. IN THE PAST MONTH, HAVE YOU WISHED YOU WERE DEAD OR WISHED YOU COULD GO TO SLEEP AND NOT WAKE UP?: NO
1. IN THE PAST MONTH, HAVE YOU WISHED YOU WERE DEAD OR WISHED YOU COULD GO TO SLEEP AND NOT WAKE UP?: NO
6. HAVE YOU EVER DONE ANYTHING, STARTED TO DO ANYTHING, OR PREPARED TO DO ANYTHING TO END YOUR LIFE?: NO

## 2024-03-12 ASSESSMENT — LIFESTYLE VARIABLES
HAVE YOU EVER FELT YOU SHOULD CUT DOWN ON YOUR DRINKING: NO
EVER HAD A DRINK FIRST THING IN THE MORNING TO STEADY YOUR NERVES TO GET RID OF A HANGOVER: NO
HAVE PEOPLE ANNOYED YOU BY CRITICIZING YOUR DRINKING: NO
EVER FELT BAD OR GUILTY ABOUT YOUR DRINKING: NO
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
## 2024-03-13 PROCEDURE — 2500000004 HC RX 250 GENERAL PHARMACY W/ HCPCS (ALT 636 FOR OP/ED): Performed by: EMERGENCY MEDICINE

## 2024-03-13 PROCEDURE — 2500000001 HC RX 250 WO HCPCS SELF ADMINISTERED DRUGS (ALT 637 FOR MEDICARE OP): Performed by: EMERGENCY MEDICINE

## 2024-03-13 PROCEDURE — 96372 THER/PROPH/DIAG INJ SC/IM: CPT

## 2024-03-13 PROCEDURE — 2500000001 HC RX 250 WO HCPCS SELF ADMINISTERED DRUGS (ALT 637 FOR MEDICARE OP): Performed by: PHARMACIST

## 2024-03-13 PROCEDURE — 2500000004 HC RX 250 GENERAL PHARMACY W/ HCPCS (ALT 636 FOR OP/ED)

## 2024-03-13 RX ORDER — ZIPRASIDONE HYDROCHLORIDE 20 MG/1
20 CAPSULE ORAL ONCE
Status: COMPLETED | OUTPATIENT
Start: 2024-03-13 | End: 2024-03-13

## 2024-03-13 RX ORDER — ZIPRASIDONE MESYLATE 20 MG/ML
20 INJECTION, POWDER, LYOPHILIZED, FOR SOLUTION INTRAMUSCULAR EVERY 12 HOURS PRN
Status: DISCONTINUED | OUTPATIENT
Start: 2024-03-13 | End: 2024-03-14 | Stop reason: HOSPADM

## 2024-03-13 RX ORDER — MIDAZOLAM HYDROCHLORIDE 1 MG/ML
INJECTION, SOLUTION INTRAMUSCULAR; INTRAVENOUS
Status: COMPLETED
Start: 2024-03-13 | End: 2024-03-13

## 2024-03-13 RX ORDER — ZIPRASIDONE HYDROCHLORIDE 20 MG/1
20 CAPSULE ORAL
Status: DISCONTINUED | OUTPATIENT
Start: 2024-03-13 | End: 2024-03-13

## 2024-03-13 RX ORDER — MIDAZOLAM HYDROCHLORIDE 5 MG/ML
5 INJECTION INTRAMUSCULAR; INTRAVENOUS ONCE
Status: DISCONTINUED | OUTPATIENT
Start: 2024-03-13 | End: 2024-03-14 | Stop reason: HOSPADM

## 2024-03-13 RX ADMIN — Medication 4.5 MG: at 11:14

## 2024-03-13 RX ADMIN — MIDAZOLAM 5 MG: 1 INJECTION INTRAMUSCULAR; INTRAVENOUS at 13:20

## 2024-03-13 RX ADMIN — BUPROPION HYDROCHLORIDE 150 MG: 150 TABLET, EXTENDED RELEASE ORAL at 09:04

## 2024-03-13 RX ADMIN — FLUOXETINE 20 MG: 20 CAPSULE ORAL at 09:04

## 2024-03-13 RX ADMIN — ZIPRASIDONE MESYLATE 20 MG: 20 INJECTION, POWDER, LYOPHILIZED, FOR SOLUTION INTRAMUSCULAR at 18:35

## 2024-03-13 RX ADMIN — ZIPRASIDONE HYDROCHLORIDE 20 MG: 20 CAPSULE ORAL at 11:21

## 2024-03-13 RX ADMIN — LAMOTRIGINE 100 MG: 100 TABLET ORAL at 21:46

## 2024-03-13 RX ADMIN — LAMOTRIGINE 100 MG: 100 TABLET ORAL at 09:00

## 2024-03-13 SDOH — HEALTH STABILITY: MENTAL HEALTH: SLEEP PATTERN: RESTLESSNESS

## 2024-03-13 SDOH — HEALTH STABILITY: MENTAL HEALTH: CONTENT: UNABLE TO ASSESS

## 2024-03-13 SDOH — HEALTH STABILITY: MENTAL HEALTH: SLEEP PATTERN: DIFFICULTY FALLING ASLEEP;RESTLESSNESS;SLEEPS ALL NIGHT

## 2024-03-13 SDOH — HEALTH STABILITY: MENTAL HEALTH: HAVE YOU WISHED YOU WERE DEAD OR WISHED YOU COULD GO TO SLEEP AND NOT WAKE UP?: NO

## 2024-03-13 SDOH — HEALTH STABILITY: MENTAL HEALTH

## 2024-03-13 SDOH — HEALTH STABILITY: MENTAL HEALTH: HAVE YOU ACTUALLY HAD ANY THOUGHTS OF KILLING YOURSELF?: NO

## 2024-03-13 SDOH — HEALTH STABILITY: MENTAL HEALTH: SLEEP PATTERN: RESTLESSNESS;DIFFICULTY FALLING ASLEEP

## 2024-03-13 SDOH — HEALTH STABILITY: MENTAL HEALTH: CONTENT: UNREMARKABLE

## 2024-03-13 SDOH — HEALTH STABILITY: MENTAL HEALTH: BEHAVIORS/MOOD: SLEEPING

## 2024-03-13 SDOH — HEALTH STABILITY: MENTAL HEALTH: BEHAVIORS/MOOD: RESTLESS

## 2024-03-13 SDOH — SOCIAL STABILITY: SOCIAL INSECURITY: FAMILY BEHAVIORS: APPROPRIATE FOR SITUATION;CALM;COOPERATIVE

## 2024-03-13 SDOH — HEALTH STABILITY: MENTAL HEALTH: IN THE PAST WEEK, HAVE YOU BEEN HAVING THOUGHTS ABOUT KILLING YOURSELF?: NO

## 2024-03-13 SDOH — HEALTH STABILITY: MENTAL HEALTH: HAVE YOU EVER DONE ANYTHING, STARTED TO DO ANYTHING, OR PREPARED TO DO ANYTHING TO END YOUR LIFE?: NO

## 2024-03-13 SDOH — SOCIAL STABILITY: SOCIAL NETWORK: VISITOR BEHAVIORS: APPROPRIATE FOR SITUATION;CALM;COOPERATIVE

## 2024-03-13 SDOH — HEALTH STABILITY: MENTAL HEALTH: BEHAVIORS/MOOD: CALM;COOPERATIVE

## 2024-03-13 SDOH — SOCIAL STABILITY: SOCIAL NETWORK: PARENT/GUARDIAN/SIGNIFICANT OTHER INVOLVEMENT: ATTENTIVE TO PATIENT NEEDS

## 2024-03-13 SDOH — HEALTH STABILITY: MENTAL HEALTH: ARE YOU HERE BECAUSE YOU TRIED TO HURT YOURSELF?: NO

## 2024-03-13 SDOH — SOCIAL STABILITY: SOCIAL NETWORK: EMOTIONAL SUPPORT GIVEN: REASSURE

## 2024-03-13 SDOH — HEALTH STABILITY: MENTAL HEALTH: SLEEP PATTERN: DIFFICULTY FALLING ASLEEP;INSOMNIA

## 2024-03-13 SDOH — HEALTH STABILITY: MENTAL HEALTH: NEEDS EXPRESSED: DENIES

## 2024-03-13 SDOH — SOCIAL STABILITY: SOCIAL NETWORK: PARENT/GUARDIAN/SIGNIFICANT OTHER INVOLVEMENT: NO INVOLVEMENT

## 2024-03-13 SDOH — HEALTH STABILITY: MENTAL HEALTH: SLEEP PATTERN: DIFFICULTY FALLING ASLEEP;RESTLESSNESS;DISTURBED/INTERRUPTED SLEEP

## 2024-03-13 SDOH — HEALTH STABILITY: MENTAL HEALTH: SUICIDE ASSESSMENT: ADULT (C-SSRS)

## 2024-03-13 SDOH — HEALTH STABILITY: MENTAL HEALTH: BEHAVIORS/MOOD: RESTLESS;ANXIOUS;IMPULSIVE

## 2024-03-13 SDOH — HEALTH STABILITY: MENTAL HEALTH: BEHAVIORS/MOOD: RESTLESS;LABILE

## 2024-03-13 SDOH — SOCIAL STABILITY: SOCIAL INSECURITY: FAMILY BEHAVIORS: UNABLE TO ASSESS

## 2024-03-13 SDOH — HEALTH STABILITY: MENTAL HEALTH: SLEEP PATTERN: DIFFICULTY FALLING ASLEEP;DISTURBED/INTERRUPTED SLEEP;RESTLESSNESS

## 2024-03-13 SDOH — HEALTH STABILITY: MENTAL HEALTH: HAS SOMETHING VERY STRESSFUL HAPPENED TO YOU IN THE PAST FEW WEEKS (A SITUATION VERY HARD TO HANDLE)?: NO

## 2024-03-13 SDOH — HEALTH STABILITY: MENTAL HEALTH: HAVE YOU EVER TRIED TO HURT YOURSELF IN THE PAST (OTHER THAN THIS TIME)?: NO

## 2024-03-13 SDOH — HEALTH STABILITY: MENTAL HEALTH: BEHAVIORS/MOOD: CALM;RESTLESS

## 2024-03-13 SDOH — SOCIAL STABILITY: SOCIAL NETWORK: VISITOR BEHAVIORS: UNABLE TO ASSESS

## 2024-03-13 ASSESSMENT — COLUMBIA-SUICIDE SEVERITY RATING SCALE - C-SSRS
1. SINCE LAST CONTACT, HAVE YOU WISHED YOU WERE DEAD OR WISHED YOU COULD GO TO SLEEP AND NOT WAKE UP?: NO
2. HAVE YOU ACTUALLY HAD ANY THOUGHTS OF KILLING YOURSELF?: NO
6. HAVE YOU EVER DONE ANYTHING, STARTED TO DO ANYTHING, OR PREPARED TO DO ANYTHING TO END YOUR LIFE?: NO

## 2024-03-13 NOTE — PROGRESS NOTES
I have accept care of this patient in signout.    In summary:  I received this patient in signout in summary this is a 33-year-old patient who is pink by Pratik Pichardo found to have alexus and psychosis.  Patient is pending placement at [facility].  No issues during my shift.      Labs Reviewed   DRUG SCREEN,URINE - Abnormal       Result Value    Amphetamine Screen, Urine Presumptive Negative      Barbiturate Screen, Urine Presumptive Negative      Benzodiazepines Screen, Urine Presumptive Negative      Cannabinoid Screen, Urine Presumptive Positive (*)     Cocaine Metabolite Screen, Urine Presumptive Negative      Fentanyl Screen, Urine Presumptive Negative      Opiate Screen, Urine Presumptive Negative      Oxycodone Screen, Urine Presumptive Negative      PCP Screen, Urine Presumptive Negative      Methadone Screen, Urine Presumptive Negative      Narrative:     Drug screen results are presumptive and should not be used to assess   compliance with prescribed medication. Contact the performing [facility] laboratory   to add-on definitive confirmatory testing if clinically indicated.    Toxicology screening results are reported qualitatively. The concentration must   be greater than or equal to the cutoff to be reported as positive. The concentration   at which the screening test can detect an individual drug or metabolite varies.   The absence of expected drug(s) and/or drug metabolite(s) may indicate non-compliance,   inappropriate timing of specimen collection relative to drug administration, poor drug   absorption, diluted/adulterated urine, or limitations of testing. For medical purposes   only; not valid for forensic use.    Interpretive questions should be directed to the laboratory medical directors.   ALCOHOL - Normal    Alcohol <10       No orders to display

## 2024-03-13 NOTE — PROGRESS NOTES
Emergency Medicine Transition of Care Note.    I received Anne Parker in signout from Dr. Ranulfo CASTAÑEDA.  Please see the previous ED provider note for all HPI, PE and MDM up to the time of signout at 1600. This is in addition to the primary record.    In brief Anne Parker is an 33 y.o. female presenting for   Chief Complaint   Patient presents with    Psychiatric Evaluation     Hx of BPD recently increased Wellbutrin from 75mg to 150mg. Reports no other medications. Patient eloped earlier from ED while under no PINK slip, ran to [facility], then eloped from [facility]. PCSO found patient and brought back to ED. Patient now PINK slipped by [facility]. Patient calm and cooperative at this time. Sitter at bedside.      At the time of signout we were awaiting: Placement at [facility]    Diagnoses as of 03/13/24 0033   Psychosis, unspecified psychosis type (CMS/HCC)       Medical Decision Making  Patient was signed out pending placement at [facility].  Patient remained calm and cooperative throughout my shift and was signed out to the oncoming team still pending placement at [facility].    Final diagnoses:   [F29] Psychosis, unspecified psychosis type (CMS/HCC)           Procedure  Procedures    Dave Carbajal MD

## 2024-03-13 NOTE — PROGRESS NOTES
Patient care signed out to me by Dr. Spear.  Patient is awaiting placement.  While in the emergency room patient able to hold of a needle on a while in, superficial laceration to her wrist.  Patient attempted to elope from the emergency department.  Patient stopped by staff.  Patient was carried by 4 people, including myself, on each limb into the room and physical hold was used to place the patient and locking restraints.    [facility]/ED Course  ED Course as of 03/13/24 1751   Wed Mar 13, 2024   1300 Patient was able to be taken out of restraints and took oral medications.  At this time patient is now being her head and the door.  Will put back in restraints and medications in order. []   1745 Sister, Michelle, updated on events today. She reports repressed memories coming to the patient recently since the pt's father dies a few months ago. []      ED Course User Index  [JH] Fadi Winchester MD         Diagnoses as of 03/13/24 1751   Psychosis, unspecified psychosis type (CMS/HCC)

## 2024-03-13 NOTE — PROGRESS NOTES
Emergency Medicine Transition of Care Note.    I received Anne Parker in signout from Dr. Winchester.  Please see the previous ED provider note for all HPI, PE and MDM up to the time of signout at 1752. This is in addition to the primary record.    In brief Anne Parker is an 33 y.o. female presenting for   Chief Complaint   Patient presents with    Psychiatric Evaluation     Hx of BPD recently increased Wellbutrin from 75mg to 150mg. Reports no other medications. Patient eloped earlier from ED while under no PINK slip, ran to [facility], then eloped from [facility]. PCSO found patient and brought back to ED. Patient now PINK slipped by [facility]. Patient calm and cooperative at this time. Sitter at bedside.      At the time of signout we were awaiting: A bed at [facility].  ED Course as of 03/13/24 1752   Wed Mar 13, 2024   1300 Patient was able to be taken out of restraints and took oral medications.  At this time patient is now being her head and the door.  Will put back in restraints and medications in order. []   1745 Sister, Michelle, updated on events today. She reports repressed memories coming to the patient recently since the pt's father dies a few months ago. [JH]      ED Course User Index  [JH] Fadi Winchester MD         Diagnoses as of 03/13/24 1752   Psychosis, unspecified psychosis type (CMS/HCC)       Medical Decision Making      Final diagnoses:   [F29] Psychosis, unspecified psychosis type (CMS/HCC)     Patient is currently hemodynamically stable.  She is pending a bed for treatment for psychiatric disorder.  Patient remained stable throughout her stay in the emergency department and was signed out to the overnight physician.    Procedure  Procedures    Nadira Aguilar MD

## 2024-03-14 VITALS
BODY MASS INDEX: 26.53 KG/M2 | SYSTOLIC BLOOD PRESSURE: 115 MMHG | HEART RATE: 79 BPM | WEIGHT: 169 LBS | RESPIRATION RATE: 18 BRPM | OXYGEN SATURATION: 96 % | DIASTOLIC BLOOD PRESSURE: 74 MMHG | HEIGHT: 67 IN | TEMPERATURE: 98.2 F

## 2024-03-14 LAB
APPEARANCE UR: CLEAR
BILIRUB UR STRIP.AUTO-MCNC: NEGATIVE MG/DL
COLOR UR: YELLOW
GLUCOSE UR STRIP.AUTO-MCNC: NEGATIVE MG/DL
KETONES UR STRIP.AUTO-MCNC: ABNORMAL MG/DL
LEUKOCYTE ESTERASE UR QL STRIP.AUTO: NEGATIVE
NITRITE UR QL STRIP.AUTO: NEGATIVE
PH UR STRIP.AUTO: 6 [PH]
PROT UR STRIP.AUTO-MCNC: NEGATIVE MG/DL
RBC # UR STRIP.AUTO: NEGATIVE /UL
SP GR UR STRIP.AUTO: 1.01
UROBILINOGEN UR STRIP.AUTO-MCNC: <2 MG/DL

## 2024-03-14 PROCEDURE — 2500000001 HC RX 250 WO HCPCS SELF ADMINISTERED DRUGS (ALT 637 FOR MEDICARE OP): Performed by: EMERGENCY MEDICINE

## 2024-03-14 PROCEDURE — 81003 URINALYSIS AUTO W/O SCOPE: CPT | Performed by: STUDENT IN AN ORGANIZED HEALTH CARE EDUCATION/TRAINING PROGRAM

## 2024-03-14 PROCEDURE — 2500000001 HC RX 250 WO HCPCS SELF ADMINISTERED DRUGS (ALT 637 FOR MEDICARE OP): Performed by: PHARMACIST

## 2024-03-14 RX ORDER — LORAZEPAM 1 MG/1
1 TABLET ORAL ONCE
Status: COMPLETED | OUTPATIENT
Start: 2024-03-14 | End: 2024-03-14

## 2024-03-14 RX ADMIN — FLUOXETINE 20 MG: 20 CAPSULE ORAL at 10:46

## 2024-03-14 RX ADMIN — LORAZEPAM 1 MG: 1 TABLET ORAL at 13:28

## 2024-03-14 RX ADMIN — BUPROPION HYDROCHLORIDE 150 MG: 150 TABLET, EXTENDED RELEASE ORAL at 10:46

## 2024-03-14 RX ADMIN — LAMOTRIGINE 100 MG: 100 TABLET ORAL at 10:45

## 2024-03-14 RX ADMIN — Medication 4.5 MG: at 10:47

## 2024-03-14 SDOH — HEALTH STABILITY: MENTAL HEALTH: BEHAVIORS/MOOD: SLEEPING

## 2024-03-14 SDOH — HEALTH STABILITY: MENTAL HEALTH: BEHAVIORS/MOOD: CALM;COOPERATIVE

## 2024-03-14 SDOH — HEALTH STABILITY: MENTAL HEALTH: CONTENT: UNABLE TO ASSESS

## 2024-03-14 SDOH — HEALTH STABILITY: MENTAL HEALTH: BEHAVIORS/MOOD: CALM;COOPERATIVE;APPROPRIATE FOR AGE;APPROPRIATE FOR SITUATION

## 2024-03-14 SDOH — HEALTH STABILITY: MENTAL HEALTH: CONTENT: UNREMARKABLE

## 2024-03-14 SDOH — HEALTH STABILITY: MENTAL HEALTH

## 2024-03-14 NOTE — PROGRESS NOTES
Emergency Medicine Transition of Care Note.    I received Anne Parker in signout from Dr. Spear.  Please see the previous ED provider note for all HPI, PE and MDM up to the time of signout. This is in addition to the primary record.    In brief Anne Parker is an 33 y.o. female presenting for   Chief Complaint   Patient presents with    Psychiatric Evaluation     Hx of BPD recently increased Wellbutrin from 75mg to 150mg. Reports no other medications. Patient eloped earlier from ED while under no PINK slip, ran to [facility], then eloped from [facility]. PCSO found patient and brought back to ED. Patient now PINK slipped by [facility]. Patient calm and cooperative at this time. Sitter at bedside.         ED Course as of 03/14/24 1159   Wed Mar 13, 2024   1300 Patient was able to be taken out of restraints and took oral medications.  At this time patient is now being her head and the door.  Will put back in restraints and medications in order. []   4625 Sister, Michelle, updated on events today. She reports repressed memories coming to the patient recently since the pt's father dies a few months ago. [JH]      ED Course User Index  [JH] Fadi Winchester MD         Diagnoses as of 03/14/24 1159   Psychosis, unspecified psychosis type (CMS/HCC)       Medical Decision Making  Patient was signed out to me pending psychiatric placement.  They were able to secure indigent funding for this patient so she has been accepted at [facility].        Final diagnoses:   [F29] Psychosis, unspecified psychosis type (CMS/HCC)           Procedure  Procedures    Collin Suárez MD

## 2024-03-14 NOTE — PROGRESS NOTES
Spiritual Care Visit    Clinical Encounter Type  Visited With: Patient  Routine Visit: Introduction    [facility] Encounters  [facility] Needs: Prayer

## 2024-03-14 NOTE — PROGRESS NOTES
I have accept care of this patient in signout.    In summary:  I received this patient in signout.  Patient is pending bed availability.  While here she did have urinary retention which she states she has had intermittently before.  Bedside ultrasound showed greater than 1 L and she was straight cathed.  Otherwise no other issues or concerns.  Urine was sent down for concerns of an infection and is negative.      Labs Reviewed   DRUG SCREEN,URINE - Abnormal       Result Value    Amphetamine Screen, Urine Presumptive Negative      Barbiturate Screen, Urine Presumptive Negative      Benzodiazepines Screen, Urine Presumptive Negative      Cannabinoid Screen, Urine Presumptive Positive (*)     Cocaine Metabolite Screen, Urine Presumptive Negative      Fentanyl Screen, Urine Presumptive Negative      Opiate Screen, Urine Presumptive Negative      Oxycodone Screen, Urine Presumptive Negative      PCP Screen, Urine Presumptive Negative      Methadone Screen, Urine Presumptive Negative      Narrative:     Drug screen results are presumptive and should not be used to assess   compliance with prescribed medication. Contact the performing [facility] laboratory   to add-on definitive confirmatory testing if clinically indicated.    Toxicology screening results are reported qualitatively. The concentration must   be greater than or equal to the cutoff to be reported as positive. The concentration   at which the screening test can detect an individual drug or metabolite varies.   The absence of expected drug(s) and/or drug metabolite(s) may indicate non-compliance,   inappropriate timing of specimen collection relative to drug administration, poor drug   absorption, diluted/adulterated urine, or limitations of testing. For medical purposes   only; not valid for forensic use.    Interpretive questions should be directed to the laboratory medical directors.   URINALYSIS WITH REFLEX MICROSCOPIC - Abnormal    Color, Urine Yellow       Appearance, Urine Clear      Specific Gravity, Urine 1.008      pH, Urine 6.0      Protein, Urine NEGATIVE      Glucose, Urine NEGATIVE      Blood, Urine NEGATIVE      Ketones, Urine 20 (1+) (*)     Bilirubin, Urine NEGATIVE      Urobilinogen, Urine <2.0      Nitrite, Urine NEGATIVE      Leukocyte Esterase, Urine NEGATIVE     ALCOHOL - Normal    Alcohol <10       No orders to display

## 2024-03-15 NOTE — ED PROVIDER NOTES
HPI   Chief Complaint   Patient presents with    Medication Reaction    Manic Behavior       Patient was not evaluated on this visit.  She eloped.  She then came back in and is a separate chart.  Please see that patient encounter.                          Poly Coma Scale Score: 15                     Patient History   Past Medical History:   Diagnosis Date    Bipolar 1 disorder (CMS/[facility])      No past surgical history on file.  No family history on file.  Social History     Tobacco Use    Smoking status: Not on file    Smokeless tobacco: Not on file   Substance Use Topics    Alcohol use: Not on file    Drug use: Not on file       Physical Exam   ED Triage Vitals [03/12/24 0244]   Temperature Heart Rate Respirations BP   37.3 °C (99.1 °F) (!) 115 16 131/90      Pulse Ox Temp Source Heart Rate Source Patient Position   96 % Temporal Monitor Sitting      BP Location FiO2 (%)     Left arm --       Physical Exam    ED Course & MDM   Diagnoses as of 03/14/24 2209   Psychosis, unspecified psychosis type (CMS/HCC)       Medical Decision Making      Procedure  Procedures     Inderjit Dalton MD  03/14/24 0723

## 2024-03-16 LAB
ATRIAL RATE: 97 BPM
P AXIS: 67 DEGREES
PR INTERVAL: 127 MS
Q ONSET: 252 MS
QRS COUNT: 16 BEATS
QRS DURATION: 84 MS
QT INTERVAL: 298 MS
QTC CALCULATION(BAZETT): 379 MS
QTC FREDERICIA: 349 MS
R AXIS: 68 DEGREES
T AXIS: -65 DEGREES
T OFFSET: 401 MS
VENTRICULAR RATE: 97 BPM

## 2024-04-07 ENCOUNTER — HOSPITAL ENCOUNTER (EMERGENCY)
Facility: HOSPITAL | Age: 34
Discharge: LEFT BEFORE DISCHARGE | End: 2024-04-07

## 2024-04-07 VITALS
DIASTOLIC BLOOD PRESSURE: 81 MMHG | BODY MASS INDEX: 25.58 KG/M2 | HEART RATE: 100 BPM | HEIGHT: 67 IN | RESPIRATION RATE: 18 BRPM | TEMPERATURE: 98.1 F | WEIGHT: 163 LBS | SYSTOLIC BLOOD PRESSURE: 121 MMHG | OXYGEN SATURATION: 95 %

## 2024-04-07 PROCEDURE — 4500999001 HC ED NO CHARGE

## 2024-04-07 ASSESSMENT — COLUMBIA-SUICIDE SEVERITY RATING SCALE - C-SSRS
1. IN THE PAST MONTH, HAVE YOU WISHED YOU WERE DEAD OR WISHED YOU COULD GO TO SLEEP AND NOT WAKE UP?: NO
6. HAVE YOU EVER DONE ANYTHING, STARTED TO DO ANYTHING, OR PREPARED TO DO ANYTHING TO END YOUR LIFE?: NO
2. HAVE YOU ACTUALLY HAD ANY THOUGHTS OF KILLING YOURSELF?: NO

## 2024-04-07 ASSESSMENT — PAIN SCALES - GENERAL: PAINLEVEL_OUTOF10: 5 - MODERATE PAIN

## 2024-04-07 ASSESSMENT — PAIN - FUNCTIONAL ASSESSMENT: PAIN_FUNCTIONAL_ASSESSMENT: 0-10

## 2024-04-07 ASSESSMENT — PAIN DESCRIPTION - LOCATION: LOCATION: BACK

## 2024-04-12 ENCOUNTER — HOSPITAL ENCOUNTER (EMERGENCY)
Facility: HOSPITAL | Age: 34
Discharge: HOME | End: 2024-04-12
Attending: EMERGENCY MEDICINE

## 2024-04-12 VITALS
OXYGEN SATURATION: 96 % | SYSTOLIC BLOOD PRESSURE: 122 MMHG | WEIGHT: 159 LBS | RESPIRATION RATE: 16 BRPM | BODY MASS INDEX: 24.96 KG/M2 | TEMPERATURE: 98.2 F | HEART RATE: 82 BPM | DIASTOLIC BLOOD PRESSURE: 82 MMHG | HEIGHT: 67 IN

## 2024-04-12 DIAGNOSIS — R33.9 URINARY RETENTION: Primary | ICD-10-CM

## 2024-04-12 LAB
ANION GAP SERPL CALC-SCNC: 10 MMOL/L (ref 10–20)
APPEARANCE UR: ABNORMAL
BILIRUB UR STRIP.AUTO-MCNC: NEGATIVE MG/DL
BUN SERPL-MCNC: 8 MG/DL (ref 6–23)
CALCIUM SERPL-MCNC: 9.3 MG/DL (ref 8.6–10.3)
CHLORIDE SERPL-SCNC: 104 MMOL/L (ref 98–107)
CO2 SERPL-SCNC: 30 MMOL/L (ref 21–32)
COLOR UR: YELLOW
CREAT SERPL-MCNC: 0.87 MG/DL (ref 0.5–1.05)
EGFRCR SERPLBLD CKD-EPI 2021: 89 ML/MIN/1.73M*2
GLUCOSE SERPL-MCNC: 97 MG/DL (ref 74–99)
GLUCOSE UR STRIP.AUTO-MCNC: NEGATIVE MG/DL
HCG UR QL IA.RAPID: NEGATIVE
HOLD SPECIMEN: NORMAL
KETONES UR STRIP.AUTO-MCNC: ABNORMAL MG/DL
LEUKOCYTE ESTERASE UR QL STRIP.AUTO: NEGATIVE
NITRITE UR QL STRIP.AUTO: NEGATIVE
PH UR STRIP.AUTO: 7 [PH]
POTASSIUM SERPL-SCNC: 3.9 MMOL/L (ref 3.5–5.3)
PROT UR STRIP.AUTO-MCNC: NEGATIVE MG/DL
RBC # UR STRIP.AUTO: NEGATIVE /UL
SODIUM SERPL-SCNC: 140 MMOL/L (ref 136–145)
SP GR UR STRIP.AUTO: 1.01
UROBILINOGEN UR STRIP.AUTO-MCNC: <2 MG/DL

## 2024-04-12 PROCEDURE — 81025 URINE PREGNANCY TEST: CPT | Performed by: EMERGENCY MEDICINE

## 2024-04-12 PROCEDURE — 51702 INSERT TEMP BLADDER CATH: CPT

## 2024-04-12 PROCEDURE — 36415 COLL VENOUS BLD VENIPUNCTURE: CPT | Performed by: EMERGENCY MEDICINE

## 2024-04-12 PROCEDURE — 99283 EMERGENCY DEPT VISIT LOW MDM: CPT | Mod: 25

## 2024-04-12 PROCEDURE — 51798 US URINE CAPACITY MEASURE: CPT

## 2024-04-12 PROCEDURE — 81003 URINALYSIS AUTO W/O SCOPE: CPT | Performed by: EMERGENCY MEDICINE

## 2024-04-12 PROCEDURE — 80048 BASIC METABOLIC PNL TOTAL CA: CPT | Performed by: EMERGENCY MEDICINE

## 2024-04-12 RX ORDER — HALOPERIDOL 1 MG/1
1 TABLET ORAL ONCE
Status: DISCONTINUED | OUTPATIENT
Start: 2024-04-12 | End: 2024-04-12 | Stop reason: HOSPADM

## 2024-04-12 ASSESSMENT — COLUMBIA-SUICIDE SEVERITY RATING SCALE - C-SSRS
6. HAVE YOU EVER DONE ANYTHING, STARTED TO DO ANYTHING, OR PREPARED TO DO ANYTHING TO END YOUR LIFE?: NO
1. IN THE PAST MONTH, HAVE YOU WISHED YOU WERE DEAD OR WISHED YOU COULD GO TO SLEEP AND NOT WAKE UP?: NO
2. HAVE YOU ACTUALLY HAD ANY THOUGHTS OF KILLING YOURSELF?: NO

## 2024-04-12 ASSESSMENT — PAIN - FUNCTIONAL ASSESSMENT: PAIN_FUNCTIONAL_ASSESSMENT: 0-10

## 2024-04-12 ASSESSMENT — PAIN SCALES - GENERAL: PAINLEVEL_OUTOF10: 7

## 2024-04-12 NOTE — ED PROVIDER NOTES
Emergency Department Provider Note        MEDICAL DECISION MAKING:  Medical Decision Making  Amount and/or Complexity of Data Reviewed  External Data Reviewed: labs and notes.  Labs: ordered. Decision-making details documented in ED Course.    Risk  OTC drugs.         4/12/24     Chief Complaint   Patient presents with    Urinary Retention     X 2 days      History/Exam limitations: none.   Additional history was obtained from patient.    HPI: Anne Parker  is a 33 y.o. presents with urinary retention for 2 days.  Has not been able to urinate.  Has had this happen before.  States it is due to dysautonomia.  Denies any injury.  Denies any pelvic pain.  No vaginal bleeding or discharge.  Denies vomiting, nausea, diarrhea or back pain.  No fevers.  Symptoms are constant without modifying factors.    Past medical records, Past medical history and surgical history reviewed and as documented.  History reviewed and as noted. past medical records reviewed.    Active Ambulatory Problems     Diagnosis Date Noted    No Active Ambulatory Problems     Resolved Ambulatory Problems     Diagnosis Date Noted    No Resolved Ambulatory Problems     Past Medical History:   Diagnosis Date    Bipolar 1 disorder (Multi)         No past surgical history on file.     No family history on file.             Unless otherwise stated in this report the patient's positive and negative responses for review of systems for constitutional, eyes, ENT, cardiovascular, respiratory, gastrointestinal, neurological, genitourinary, musculoskeletal, and integument systems and related systems to the presenting problem are either as stated in the HPI or were not pertinent or were negative for the symptoms and/or complaints related to the presenting medical problem.    PHYSICAL EXAM  Triage/nursing notes and vital signs reviewed as available and as noted above.     Vitals:    04/12/24 0537   BP: 122/82   BP Location: Left arm   Patient Position: Sitting  "  Pulse: 91   Resp: 16   Temp: 36.8 °C (98.2 °F)   TempSrc: Temporal   SpO2: 96%   Weight: 72.1 kg (159 lb)   Height: 1.702 m (5' 7\")         Vital Signs reviewed and noted to be within normal limits. the patient is not hypoxic.  -General: Alert, no acute distress, patient resting comfortably  -Skin: warm, intact, no pallor noted  -Head: Normocephalic, atraumatic  -Eye: Normal conjunctiva  -Cardiac: Normal peripheral perfusion  -Respiratory: No acute distress  -Musculoskeletal: No deformity, full ROM.  -Neurological: alert and oriented, normal sensory and motor observed.  -Psychiatric: Cooperative    ED COURSE  Current subjective and objective findings, differential diagnosis includes but not limited to urinary retention, ALYSSA      Work-up performed to evaluate for differential diagnosis as clinically indicated   Orders Placed This Encounter   Procedures    Bladder Catheterization    Urinalysis with Reflex Culture and Microscopic    hCG, Urine, Qualitative    Basic metabolic panel    Urinalysis with Reflex Culture and Microscopic    Extra Urine Gray Tube    Vital Signs    Bladder scan    Maintain Urethral Catheter with Nurse Driven Indwelling Urethral Catheter Removal Protocol    Insert urethral catheter          Labs and imaging reviewed by me  and note           Labs Reviewed   URINALYSIS WITH REFLEX CULTURE AND MICROSCOPIC - Abnormal       Result Value    Color, Urine Yellow      Appearance, Urine Hazy (*)     Specific Gravity, Urine 1.015      pH, Urine 7.0      Protein, Urine NEGATIVE      Glucose, Urine NEGATIVE      Blood, Urine NEGATIVE      Ketones, Urine 20 (1+) (*)     Bilirubin, Urine NEGATIVE      Urobilinogen, Urine <2.0      Nitrite, Urine NEGATIVE      Leukocyte Esterase, Urine NEGATIVE     HCG, URINE, QUALITATIVE - Normal    HCG, Urine NEGATIVE     URINALYSIS WITH REFLEX CULTURE AND MICROSCOPIC    Narrative:     The following orders were created for panel order Urinalysis with Reflex Culture and " Microscopic.  Procedure                               Abnormality         Status                     ---------                               -----------         ------                     Urinalysis with Reflex C...[[id number]]  Abnormal            Final result               Extra Urine Gray Tube[[id number]]                            In process                   Please view results for these tests on the individual orders.   BASIC METABOLIC PANEL   EXTRA URINE GRAY TUBE        No orders to display            Pt course which Intervention and treatment included :    Procedure  Procedures    Medications   haloperidol (Haldol) tablet 1 mg (has no administration in time range)        ED Course as of 04/12/24 0657   Fri Apr 12, 2024   0558 Patient retaining urine, over 800.  Requesting Carvajal catheter.  Will place Carvajal.  Check urinalysis and BMP for renal function.  Discharged with urology follow-up [ML]   0642 Patient noted to be feeling anxious pending in the ER.  She has a history of psychosis and bipolar.  Will give the patient a dose of Haldol x 1. [ML]      ED Course User Index  [ML] Marty R Lejeune,          Diagnoses as of 04/12/24 0657   Urinary retention          DISPOSITION: Discharge  Patient is stable for discharge.  Based upon my history, physical exam, evaluation and judgement regarding the aforementioned differentials, at the time of this assessment, I do not see evidence to support the presence of any life, neurologic, or limb threatening pathology in my considered differentials. Alternate non life threatening pathology has been considered, and has been ruled out based upon the findings of my evaluation. While there may be remaining pathology considered within the differential, this is not life threatening, not limb threatening, and does not warrant further emergent evaluation or treatment at this time.  Shared decision making made with the patient as applicable.  It is my judgement that further  treatment and evaluation can safely proceed in the outpatient setting. Shared decision making was utilized to arrive at all clinical decisions. At this time I do not see evidence of an emergency medical condition based upon my medical screening examination.  All imaging and laboratory results were discussed with the patient in detail including acute as well as incidental findings.  I discussed the differential, results and discharge plan with the patient and/or family/friend/caregiver if present. Education and reassurance provided regards to presumed diagnosis. I emphasized the importance of follow-up with the physician I referred them to in the timeframe recommended. I explained reasons for the patient to return to the Emergency Department. Additional verbal discharge instructions were also given and discussed with the patient to supplement those generated by the EMR. We also discussed medications that were prescribed (if any) including common side effects and interactions as well as proper dosing and administration. The patient was advised to abstain from driving, operating heavy machinery or making significant decisions while taking medications such as opiates and muscle relaxers that may impair this. All questions were addressed. They understand return precautions and discharge instructions. The patient and/or family/friend/caregiver expressed understanding    1. Urinary retention           -------------------------------------------------------------------    04/12/24 at 5:56 AM - Marty R LeJeune, DO   Internal & Emergency Medicine          Marty R Lejeune, DO  Resident  04/12/24 0658

## 2024-04-18 ENCOUNTER — HOSPITAL ENCOUNTER (EMERGENCY)
Facility: HOSPITAL | Age: 34
Discharge: HOME | End: 2024-04-18

## 2024-04-18 VITALS
WEIGHT: 155 LBS | DIASTOLIC BLOOD PRESSURE: 71 MMHG | BODY MASS INDEX: 24.33 KG/M2 | TEMPERATURE: 98.6 F | HEART RATE: 74 BPM | HEIGHT: 67 IN | RESPIRATION RATE: 18 BRPM | SYSTOLIC BLOOD PRESSURE: 112 MMHG | OXYGEN SATURATION: 97 %

## 2024-04-18 DIAGNOSIS — T83.9XXA URINARY CATHETER COMPLICATION, INITIAL ENCOUNTER (CMS-HCC): Primary | ICD-10-CM

## 2024-04-18 LAB
AMORPH CRY #/AREA UR COMP ASSIST: ABNORMAL /HPF
APPEARANCE UR: ABNORMAL
BILIRUB UR STRIP.AUTO-MCNC: NEGATIVE MG/DL
COLOR UR: YELLOW
GLUCOSE UR STRIP.AUTO-MCNC: NEGATIVE MG/DL
HCG UR QL IA.RAPID: NEGATIVE
HOLD SPECIMEN: NORMAL
KETONES UR STRIP.AUTO-MCNC: NEGATIVE MG/DL
LEUKOCYTE ESTERASE UR QL STRIP.AUTO: NEGATIVE
MUCOUS THREADS #/AREA URNS AUTO: ABNORMAL /LPF
NITRITE UR QL STRIP.AUTO: NEGATIVE
PH UR STRIP.AUTO: 7 [PH]
PROT UR STRIP.AUTO-MCNC: NEGATIVE MG/DL
RBC # UR STRIP.AUTO: ABNORMAL /UL
RBC #/AREA URNS AUTO: >20 /HPF
SP GR UR STRIP.AUTO: 1.01
UROBILINOGEN UR STRIP.AUTO-MCNC: <2 MG/DL
WBC #/AREA URNS AUTO: ABNORMAL /HPF

## 2024-04-18 PROCEDURE — 99283 EMERGENCY DEPT VISIT LOW MDM: CPT

## 2024-04-18 PROCEDURE — 81003 URINALYSIS AUTO W/O SCOPE: CPT | Performed by: EMERGENCY MEDICINE

## 2024-04-18 PROCEDURE — 81025 URINE PREGNANCY TEST: CPT | Performed by: EMERGENCY MEDICINE

## 2024-04-18 ASSESSMENT — LIFESTYLE VARIABLES
TOTAL SCORE: 0
HAVE PEOPLE ANNOYED YOU BY CRITICIZING YOUR DRINKING: NO
EVER FELT BAD OR GUILTY ABOUT YOUR DRINKING: NO
EVER HAD A DRINK FIRST THING IN THE MORNING TO STEADY YOUR NERVES TO GET RID OF A HANGOVER: NO
HAVE YOU EVER FELT YOU SHOULD CUT DOWN ON YOUR DRINKING: NO

## 2024-04-18 ASSESSMENT — PAIN - FUNCTIONAL ASSESSMENT: PAIN_FUNCTIONAL_ASSESSMENT: 0-10

## 2024-04-18 ASSESSMENT — PAIN DESCRIPTION - PAIN TYPE: TYPE: ACUTE PAIN

## 2024-04-18 ASSESSMENT — PAIN SCALES - GENERAL: PAINLEVEL_OUTOF10: 5 - MODERATE PAIN

## 2024-04-18 NOTE — ED PROVIDER NOTES
Chief Complaint   Patient presents with    urinary sx's       HPI       33 year old female presents to the Emergency Department today complaining of recently having a brown catheter placed secondary to urinary retention from an autonomic condition. Noticed some cloudiness in the urine bag and pain at the catheter site. Denies any associated fever, chills, headache, neck pain, chest pain, shortness of breath, abdominal pain, nausea, vomiting, diarrhea, constipation, or other urinary symptoms. Requests that the catheter be removed.       History provided by:  Patient             Patient History   Past Medical History:   Diagnosis Date    Bipolar 1 disorder (Multi)      No past surgical history on file.  No family history on file.  Social History     Tobacco Use    Smoking status: Not on file    Smokeless tobacco: Not on file   Substance Use Topics    Alcohol use: Not on file    Drug use: Not on file           Physical Exam  Constitutional:       General: She is awake.      Appearance: Normal appearance.   Cardiovascular:      Rate and Rhythm: Normal rate and regular rhythm.      Pulses:           Radial pulses are 3+ on the right side and 3+ on the left side.      Heart sounds: Normal heart sounds. No murmur heard.     No friction rub. No gallop.   Pulmonary:      Effort: Pulmonary effort is normal.      Breath sounds: Normal breath sounds and air entry.   Abdominal:      General: Abdomen is flat.      Palpations: Abdomen is soft.      Tenderness: There is no abdominal tenderness. There is no right CVA tenderness or left CVA tenderness.      Comments: No suprapubic tenderness noted.    Neurological:      Mental Status: She is alert.   Psychiatric:         Behavior: Behavior is cooperative.         Labs Reviewed   URINALYSIS WITH REFLEX CULTURE AND MICROSCOPIC - Abnormal       Result Value    Color, Urine Yellow      Appearance, Urine Hazy (*)     Specific Gravity, Urine 1.015      pH, Urine 7.0      Protein, Urine  "NEGATIVE      Glucose, Urine NEGATIVE      Blood, Urine SMALL (1+) (*)     Ketones, Urine NEGATIVE      Bilirubin, Urine NEGATIVE      Urobilinogen, Urine <2.0      Nitrite, Urine NEGATIVE      Leukocyte Esterase, Urine NEGATIVE     URINALYSIS MICROSCOPIC WITH REFLEX CULTURE - Abnormal    WBC, Urine NONE      RBC, Urine >20 (*)     Mucus, Urine 1+      Amorphous Crystals, Urine 4+ (*)    HCG, URINE, QUALITATIVE - Normal    HCG, Urine NEGATIVE     URINALYSIS WITH REFLEX CULTURE AND MICROSCOPIC    Narrative:     The following orders were created for panel order Urinalysis with Reflex Culture and Microscopic.  Procedure                               Abnormality         Status                     ---------                               -----------         ------                     Urinalysis with Reflex C...[[id number]]  Abnormal            Final result               Extra Urine Gray Tube[[id number]]                            In process                   Please view results for these tests on the individual orders.   EXTRA URINE GRAY TUBE       No orders to display            ED Course & MDM   Diagnoses as of 04/18/24 0724   Urinary catheter complication, initial encounter (CMS-[facility])           Medical Decision Making  Patient was seen and evaluated by myself. Urine pregnancy was negative. Urinalysis shows no signs of infection. She as educated on the likelihood that her urinary retention will recur if the catheter is removed. Reports to understanding such. States \"I am willing to take that risk.\" Reports that she is going to self-cath. Notes that she is making an appointment with a urologist today. To return if worse in any way. Discharged in stable condition with computer instructions.     Diagnostic Impression:    1. Carvajal catheter issue           Your medication list      You have not been prescribed any medications.           Procedure  Procedures     Preet Kee, APRN-CNP  04/18/24 0724       Preet SPRINGER " JORGE Kee-CNP  04/18/24 0725

## 2024-04-18 NOTE — ED TRIAGE NOTES
Pt here with c/o cloudy urine in her brown catheter. Pt states it was placed today. Pt states she had been straight cathing prior. Not on any abx currently.

## 2024-10-07 ENCOUNTER — APPOINTMENT (OUTPATIENT)
Dept: CARDIOLOGY | Facility: HOSPITAL | Age: 34
End: 2024-10-07

## 2024-10-07 ENCOUNTER — HOSPITAL ENCOUNTER (EMERGENCY)
Facility: HOSPITAL | Age: 34
Discharge: HOME | End: 2024-10-07
Attending: STUDENT IN AN ORGANIZED HEALTH CARE EDUCATION/TRAINING PROGRAM

## 2024-10-07 ENCOUNTER — APPOINTMENT (OUTPATIENT)
Dept: RADIOLOGY | Facility: HOSPITAL | Age: 34
End: 2024-10-07

## 2024-10-07 ENCOUNTER — HOSPITAL ENCOUNTER (EMERGENCY)
Facility: HOSPITAL | Age: 34
Discharge: HOME | End: 2024-10-07

## 2024-10-07 VITALS
OXYGEN SATURATION: 97 % | HEIGHT: 67 IN | DIASTOLIC BLOOD PRESSURE: 64 MMHG | HEART RATE: 75 BPM | BODY MASS INDEX: 26.68 KG/M2 | RESPIRATION RATE: 14 BRPM | SYSTOLIC BLOOD PRESSURE: 102 MMHG | WEIGHT: 170 LBS | TEMPERATURE: 97.7 F

## 2024-10-07 VITALS
DIASTOLIC BLOOD PRESSURE: 64 MMHG | SYSTOLIC BLOOD PRESSURE: 105 MMHG | RESPIRATION RATE: 16 BRPM | WEIGHT: 172 LBS | BODY MASS INDEX: 27 KG/M2 | OXYGEN SATURATION: 96 % | HEART RATE: 88 BPM | HEIGHT: 67 IN | TEMPERATURE: 98.2 F

## 2024-10-07 DIAGNOSIS — J06.9 UPPER RESPIRATORY TRACT INFECTION, UNSPECIFIED TYPE: Primary | ICD-10-CM

## 2024-10-07 LAB
ATRIAL RATE: 77 BPM
P AXIS: 76 DEGREES
P OFFSET: 205 MS
P ONSET: 154 MS
PR INTERVAL: 134 MS
Q ONSET: 221 MS
QRS COUNT: 13 BEATS
QRS DURATION: 76 MS
QT INTERVAL: 364 MS
QTC CALCULATION(BAZETT): 411 MS
QTC FREDERICIA: 395 MS
R AXIS: 89 DEGREES
T AXIS: 27 DEGREES
T OFFSET: 403 MS
VENTRICULAR RATE: 77 BPM

## 2024-10-07 PROCEDURE — 2500000001 HC RX 250 WO HCPCS SELF ADMINISTERED DRUGS (ALT 637 FOR MEDICARE OP): Performed by: NURSE PRACTITIONER

## 2024-10-07 PROCEDURE — 4500999001 HC ED NO CHARGE

## 2024-10-07 PROCEDURE — 71046 X-RAY EXAM CHEST 2 VIEWS: CPT

## 2024-10-07 PROCEDURE — 71046 X-RAY EXAM CHEST 2 VIEWS: CPT | Performed by: RADIOLOGY

## 2024-10-07 PROCEDURE — 93005 ELECTROCARDIOGRAM TRACING: CPT

## 2024-10-07 PROCEDURE — 99283 EMERGENCY DEPT VISIT LOW MDM: CPT

## 2024-10-07 RX ORDER — BENZONATATE 100 MG/1
100 CAPSULE ORAL EVERY 8 HOURS
Qty: 21 CAPSULE | Refills: 0 | Status: SHIPPED | OUTPATIENT
Start: 2024-10-07 | End: 2024-10-14

## 2024-10-07 RX ORDER — AMOXICILLIN AND CLAVULANATE POTASSIUM 875; 125 MG/1; MG/1
1 TABLET, FILM COATED ORAL ONCE
Status: COMPLETED | OUTPATIENT
Start: 2024-10-07 | End: 2024-10-07

## 2024-10-07 RX ORDER — AMOXICILLIN AND CLAVULANATE POTASSIUM 875; 125 MG/1; MG/1
875 TABLET, FILM COATED ORAL EVERY 12 HOURS
Qty: 14 TABLET | Refills: 0 | Status: SHIPPED | OUTPATIENT
Start: 2024-10-07 | End: 2024-10-14

## 2024-10-07 RX ORDER — DOXYCYCLINE 100 MG/1
100 TABLET ORAL 2 TIMES DAILY
Qty: 14 TABLET | Refills: 0 | Status: SHIPPED | OUTPATIENT
Start: 2024-10-07 | End: 2024-10-14

## 2024-10-07 RX ORDER — DOXYCYCLINE HYCLATE 100 MG
100 TABLET ORAL ONCE
Status: COMPLETED | OUTPATIENT
Start: 2024-10-07 | End: 2024-10-07

## 2024-10-07 ASSESSMENT — COLUMBIA-SUICIDE SEVERITY RATING SCALE - C-SSRS
6. HAVE YOU EVER DONE ANYTHING, STARTED TO DO ANYTHING, OR PREPARED TO DO ANYTHING TO END YOUR LIFE?: NO
6. HAVE YOU EVER DONE ANYTHING, STARTED TO DO ANYTHING, OR PREPARED TO DO ANYTHING TO END YOUR LIFE?: NO
2. HAVE YOU ACTUALLY HAD ANY THOUGHTS OF KILLING YOURSELF?: NO
1. IN THE PAST MONTH, HAVE YOU WISHED YOU WERE DEAD OR WISHED YOU COULD GO TO SLEEP AND NOT WAKE UP?: NO
1. IN THE PAST MONTH, HAVE YOU WISHED YOU WERE DEAD OR WISHED YOU COULD GO TO SLEEP AND NOT WAKE UP?: NO
2. HAVE YOU ACTUALLY HAD ANY THOUGHTS OF KILLING YOURSELF?: NO

## 2024-10-07 ASSESSMENT — PAIN - FUNCTIONAL ASSESSMENT
PAIN_FUNCTIONAL_ASSESSMENT: 0-10
PAIN_FUNCTIONAL_ASSESSMENT: 0-10

## 2024-10-07 ASSESSMENT — PAIN SCALES - GENERAL
PAINLEVEL_OUTOF10: 2
PAINLEVEL_OUTOF10: 0 - NO PAIN

## 2024-10-07 ASSESSMENT — PAIN DESCRIPTION - DESCRIPTORS: DESCRIPTORS: PRESSURE

## 2024-10-07 ASSESSMENT — PAIN DESCRIPTION - LOCATION: LOCATION: CHEST

## 2024-10-07 NOTE — ED TRIAGE NOTES
Pt to ED with c/o cough with green mucous x4-5 days. Pt reports chest pressure with inhalation. Pt reports her sister was just diagnosed with pneumonia and tested negative for covid, whom she lives with. No meds pta.

## 2024-10-07 NOTE — ED PROVIDER NOTES
HPI   Chief Complaint   Patient presents with    Cough       34-year-old female presents today with concern for pneumonia.  Her sister was just diagnosed with pneumonia and patient is now experiencing a productive cough which causes chest pain when she coughs and causes chest pain when she takes a deep breath.  She has no history of a PE.  She denies any history of smoking.  She denies any history of alcohol use.  She denies any history of drug use.  She has long COVID and she has been put on very low-dose naltrexone for the long COVID.  She also is on Lamictal.  Her sister tested for COVID yesterday and was negative.  The cough is wet and green phlegm comes out.  She endorses fever and chills.  Her last menstrual period ended 2 weeks ago and she has an IUD in place.  She has no allergies to medication.  She denies abdominal pain, nausea, or vomiting..  She denies diarrhea or constipation.      History provided by:  Patient   used: No            Patient History   Past Medical History:   Diagnosis Date    Bipolar 1 disorder (Multi)      History reviewed. No pertinent surgical history.  No family history on file.  Social History     Tobacco Use    Smoking status: Never    Smokeless tobacco: Never   Vaping Use    Vaping status: Never Used   Substance Use Topics    Alcohol use: Never    Drug use: Never       Physical Exam   ED Triage Vitals [10/07/24 0955]   Temperature Heart Rate Respirations BP   36.5 °C (97.7 °F) 75 14 102/64      Pulse Ox Temp Source Heart Rate Source Patient Position   97 % Temporal -- --      BP Location FiO2 (%)     -- --       Physical Exam  Constitutional:       Appearance: Normal appearance.   HENT:      Head: Normocephalic and atraumatic.      Nose: Nose normal.      Mouth/Throat:      Mouth: Mucous membranes are moist.   Eyes:      Extraocular Movements: Extraocular movements intact.      Pupils: Pupils are equal, round, and reactive to light.   Cardiovascular:      Rate  and Rhythm: Normal rate and regular rhythm.      Pulses: Normal pulses.      Heart sounds: Normal heart sounds.   Pulmonary:      Effort: Pulmonary effort is normal.      Breath sounds: Normal breath sounds.   Abdominal:      General: Abdomen is flat.      Palpations: Abdomen is soft.   Musculoskeletal:         General: Normal range of motion.      Cervical back: Normal range of motion and neck supple.   Skin:     General: Skin is warm.      Capillary Refill: Capillary refill takes less than 2 seconds.   Neurological:      General: No focal deficit present.      Mental Status: She is alert and oriented to person, place, and time.   Psychiatric:         Mood and Affect: Mood normal.         Behavior: Behavior normal.           ED Course & MDM   Diagnoses as of 10/07/24 1108   Upper respiratory tract infection, unspecified type                 No data recorded     Poly Coma Scale Score: 15 (10/07/24 1017 : Trisha Rojas RN)                           Medical Decision Making  Under the adult CPG with the patient having an inflammatory autoimmune disease it is recommended that she is started on 2 antibiotics including Augmentin and doxycycline.  Patient received her first dose of Augmentin and doxycycline in our emergency department.  Because she endorsed chest pain an EKG was ordered and a chest x-ray was ordered.  EKG was normal sinus rhythm without any ST elevation or depression.  Chest x-ray showed no acute cardiopulmonary process.  I was still concerned about patient's symptoms her acute productive cough and chest pain and she was treated with concern for pneumonia.  Return precautions reviewed.  She was placed on both antibiotics and this was discussed with patient.  Follow-up with PCP as needed.  Return precautions    Amount and/or Complexity of Data Reviewed  Radiology: ordered and independent interpretation performed.     Details: X-ray unchanged from x-ray that was completed on March 11 of this year.   Clearance on the lateral view.  No infiltrate.  ECG/medicine tests: ordered and independent interpretation performed.     Details: EKG is 77 bpm.  AR interval is normal.  QT corrected is normal.  Normal sinus rhythm.  P waves are tied to the QRS.  No ST elevation or depression.  Interpreted both by attending and myself.        Procedure  Procedures     Mauricio Sarah, JORGE-WALESKA  10/07/24 1052       Mauricio Sarah, JORGE-CNP  10/07/24 1105